# Patient Record
Sex: FEMALE | Race: WHITE | NOT HISPANIC OR LATINO | Employment: OTHER | URBAN - METROPOLITAN AREA
[De-identification: names, ages, dates, MRNs, and addresses within clinical notes are randomized per-mention and may not be internally consistent; named-entity substitution may affect disease eponyms.]

---

## 2021-08-30 ENCOUNTER — OFFICE VISIT (OUTPATIENT)
Dept: FAMILY MEDICINE | Facility: CLINIC | Age: 86
End: 2021-08-30
Payer: MEDICARE

## 2021-08-30 VITALS
WEIGHT: 203.81 LBS | HEIGHT: 63 IN | SYSTOLIC BLOOD PRESSURE: 141 MMHG | BODY MASS INDEX: 36.11 KG/M2 | TEMPERATURE: 97 F | DIASTOLIC BLOOD PRESSURE: 82 MMHG | OXYGEN SATURATION: 96 % | RESPIRATION RATE: 14 BRPM | HEART RATE: 89 BPM

## 2021-08-30 DIAGNOSIS — I25.10 CORONARY ARTERY DISEASE INVOLVING NATIVE CORONARY ARTERY, ANGINA PRESENCE UNSPECIFIED, UNSPECIFIED WHETHER NATIVE OR TRANSPLANTED HEART: ICD-10-CM

## 2021-08-30 DIAGNOSIS — G47.30 SLEEP APNEA, UNSPECIFIED TYPE: ICD-10-CM

## 2021-08-30 DIAGNOSIS — R53.83 FATIGUE, UNSPECIFIED TYPE: ICD-10-CM

## 2021-08-30 DIAGNOSIS — E55.9 VITAMIN D DEFICIENCY: ICD-10-CM

## 2021-08-30 DIAGNOSIS — E78.5 HYPERLIPIDEMIA, UNSPECIFIED HYPERLIPIDEMIA TYPE: ICD-10-CM

## 2021-08-30 DIAGNOSIS — K21.9 GERD WITHOUT ESOPHAGITIS: Primary | ICD-10-CM

## 2021-08-30 DIAGNOSIS — J45.991 COUGH VARIANT ASTHMA: ICD-10-CM

## 2021-08-30 DIAGNOSIS — R73.03 PREDIABETES: ICD-10-CM

## 2021-08-30 DIAGNOSIS — R79.9 ABNORMAL FINDING OF BLOOD CHEMISTRY, UNSPECIFIED: ICD-10-CM

## 2021-08-30 PROCEDURE — 99204 PR OFFICE/OUTPT VISIT, NEW, LEVL IV, 45-59 MIN: ICD-10-PCS | Mod: S$GLB,,, | Performed by: INTERNAL MEDICINE

## 2021-08-30 PROCEDURE — 99204 OFFICE O/P NEW MOD 45 MIN: CPT | Mod: S$GLB,,, | Performed by: INTERNAL MEDICINE

## 2021-08-30 RX ORDER — DICLOFENAC POTASSIUM 50 MG/1
50 TABLET, FILM COATED ORAL 3 TIMES DAILY
COMMUNITY
End: 2021-11-29 | Stop reason: SDUPTHER

## 2021-08-30 RX ORDER — ISOSORBIDE MONONITRATE 60 MG/1
60 TABLET, EXTENDED RELEASE ORAL DAILY
COMMUNITY
End: 2024-02-05

## 2021-08-30 RX ORDER — GLUCOSAMINE/CHONDRO SU A 500-400 MG
1 TABLET ORAL 3 TIMES DAILY
COMMUNITY

## 2021-08-30 RX ORDER — ASPIRIN 81 MG/1
81 TABLET ORAL DAILY
COMMUNITY
End: 2024-02-22 | Stop reason: SDUPTHER

## 2021-08-30 RX ORDER — HYDROGEN PEROXIDE 3 %
20 SOLUTION, NON-ORAL MISCELLANEOUS
COMMUNITY
End: 2022-03-22 | Stop reason: DRUGHIGH

## 2021-08-30 RX ORDER — NITROGLYCERIN 0.4 MG/1
0.4 TABLET SUBLINGUAL EVERY 5 MIN PRN
COMMUNITY
End: 2023-08-23 | Stop reason: SDUPTHER

## 2021-08-30 RX ORDER — METFORMIN HYDROCHLORIDE 500 MG/1
500 TABLET, EXTENDED RELEASE ORAL
COMMUNITY
End: 2021-11-29

## 2021-08-30 RX ORDER — TOLTERODINE 4 MG/1
4 CAPSULE, EXTENDED RELEASE ORAL
COMMUNITY
Start: 2021-03-30 | End: 2021-08-30

## 2021-08-30 RX ORDER — ROSUVASTATIN CALCIUM 5 MG/1
5 TABLET, COATED ORAL DAILY
COMMUNITY
Start: 2021-07-15 | End: 2022-03-03 | Stop reason: SDUPTHER

## 2021-08-30 RX ORDER — SALMETEROL XINAFOATE 50 UG/1
POWDER, METERED ORAL; RESPIRATORY (INHALATION)
COMMUNITY
Start: 2021-07-09 | End: 2023-08-23 | Stop reason: SDUPTHER

## 2021-08-30 RX ORDER — DOCUSATE SODIUM 100 MG/1
100 CAPSULE, LIQUID FILLED ORAL 2 TIMES DAILY
COMMUNITY
End: 2023-08-23 | Stop reason: SDUPTHER

## 2021-09-01 LAB
ABS NRBC COUNT: 0 X 10 3/UL (ref 0–0.01)
ABSOLUTE BASOPHIL: 0.09 X 10 3/UL (ref 0–0.22)
ABSOLUTE EOSINOPHIL: 0.15 X 10 3/UL (ref 0.04–0.54)
ABSOLUTE IMMATURE GRAN: 0.05 X 10 3/UL (ref 0–0.04)
ABSOLUTE LYMPHOCYTE: 1.63 X 10 3/UL (ref 0.86–4.75)
ABSOLUTE MONOCYTE: 0.49 X 10 3/UL (ref 0.22–1.08)
ALBUMIN SERPL-MCNC: 4.7 G/DL (ref 3.5–5.2)
ALP ISOS SERPL LEV INH-CCNC: 79 U/L (ref 35–105)
ALT (SGPT): 15 U/L (ref 0–33)
ANION GAP SERPL CALC-SCNC: 11 MMOL/L (ref 8–17)
AST SERPL-CCNC: 15 U/L (ref 0–32)
BASOPHILS NFR BLD: 1.5 % (ref 0.2–1.2)
BILIRUB CONJ+UNCONJ SERPL-MCNC: NORMAL MG/DL (ref 0.1–0.8)
BILIRUBIN DIRECT+TOT PNL SERPL-MCNC: <0.2 MG/DL (ref 0–0.3)
BILIRUBIN, TOTAL: 0.39 MG/DL (ref 0–1.2)
BUN/CREAT SERPL: 20.3 (ref 6–20)
CALCIUM SERPL-MCNC: 9.8 MG/DL (ref 8.6–10.2)
CARBON DIOXIDE, CO2: 27 MMOL/L (ref 22–29)
CHLORIDE: 104 MMOL/L (ref 98–107)
CHOLEST SERPL-MSCNC: 125 MG/DL (ref 100–200)
CREAT SERPL-MCNC: 0.91 MG/DL (ref 0.5–0.9)
EOSINOPHIL NFR BLD: 2.4 % (ref 0.7–7)
ESTIMATED AVERAGE GLUCOSE: 140 MG/DL
GFR ESTIMATION: 58.21
GLOBULIN: 2 G/DL (ref 1.5–4.5)
GLUCOSE: 127 MG/DL (ref 82–115)
HBA1C MFR BLD: 6.5 % (ref 4–6)
HCT VFR BLD AUTO: 37.3 % (ref 37–47)
HDLC SERPL-MCNC: 52 MG/DL
HGB BLD-MCNC: 11.8 G/DL (ref 12–16)
IMMATURE GRANULOCYTES: 0.8 % (ref 0–0.5)
IRON SERPL-MCNC: 62 UG/DL (ref 37–145)
LDL/HDL RATIO: 1.1 (ref 1–3)
LDLC SERPL CALC-MCNC: 54.8 MG/DL (ref 0–100)
LYMPHOCYTES NFR BLD: 26.3 % (ref 19.3–53.1)
MCH RBC QN AUTO: 28 PG (ref 27–32)
MCHC RBC AUTO-ENTMCNC: 31.6 G/DL (ref 32–36)
MCV RBC AUTO: 88.4 FL (ref 82–100)
MONOCYTES NFR BLD: 7.9 % (ref 4.7–12.5)
NEUTROPHILS # BLD AUTO: 3.78 X 10 3/UL (ref 2.15–7.56)
NEUTROPHILS NFR BLD: 61.1 %
NUCLEATED RED BLOOD CELLS: 0 /100 WBC (ref 0–0.2)
PLATELET # BLD AUTO: 219 X 10 3/UL (ref 135–400)
POTASSIUM: 4.1 MMOL/L (ref 3.5–5.1)
PROT SNV-MCNC: 6.7 G/DL (ref 6.4–8.3)
RBC # BLD AUTO: 4.22 X 10 6/UL (ref 4.2–5.4)
RDW-SD: 46.2 FL (ref 37–54)
SODIUM: 142 MMOL/L (ref 136–145)
TRIGL SERPL-MCNC: 91 MG/DL (ref 0–150)
TSH SERPL DL<=0.005 MIU/L-ACNC: 4.18 UIU/ML (ref 0.27–4.2)
UREA NITROGEN (BUN): 18.5 MG/DL (ref 8–23)
VITAMIN D (25OHD): 50.3 NG/ML
WBC # BLD: 6.19 X 10 3/UL (ref 4.3–10.8)

## 2021-09-02 ENCOUNTER — TELEPHONE (OUTPATIENT)
Dept: FAMILY MEDICINE | Facility: CLINIC | Age: 86
End: 2021-09-02

## 2021-09-07 ENCOUNTER — TELEPHONE (OUTPATIENT)
Dept: FAMILY MEDICINE | Facility: CLINIC | Age: 86
End: 2021-09-07

## 2021-11-29 ENCOUNTER — OFFICE VISIT (OUTPATIENT)
Dept: FAMILY MEDICINE | Facility: CLINIC | Age: 86
End: 2021-11-29
Payer: MEDICARE

## 2021-11-29 VITALS
DIASTOLIC BLOOD PRESSURE: 84 MMHG | TEMPERATURE: 97 F | HEART RATE: 83 BPM | HEIGHT: 63 IN | WEIGHT: 207.38 LBS | SYSTOLIC BLOOD PRESSURE: 155 MMHG | BODY MASS INDEX: 36.75 KG/M2 | OXYGEN SATURATION: 94 %

## 2021-11-29 DIAGNOSIS — E55.9 VITAMIN D DEFICIENCY: ICD-10-CM

## 2021-11-29 DIAGNOSIS — R73.03 PREDIABETES: ICD-10-CM

## 2021-11-29 DIAGNOSIS — K59.00 CONSTIPATION, UNSPECIFIED CONSTIPATION TYPE: ICD-10-CM

## 2021-11-29 DIAGNOSIS — I25.10 CORONARY ARTERY DISEASE INVOLVING NATIVE CORONARY ARTERY, UNSPECIFIED WHETHER ANGINA PRESENT, UNSPECIFIED WHETHER NATIVE OR TRANSPLANTED HEART: ICD-10-CM

## 2021-11-29 DIAGNOSIS — D50.9 IRON DEFICIENCY ANEMIA, UNSPECIFIED IRON DEFICIENCY ANEMIA TYPE: ICD-10-CM

## 2021-11-29 DIAGNOSIS — K21.9 GERD WITHOUT ESOPHAGITIS: ICD-10-CM

## 2021-11-29 DIAGNOSIS — E11.9 TYPE 2 DIABETES MELLITUS WITHOUT COMPLICATION, WITHOUT LONG-TERM CURRENT USE OF INSULIN: ICD-10-CM

## 2021-11-29 DIAGNOSIS — J45.991 COUGH VARIANT ASTHMA: Primary | ICD-10-CM

## 2021-11-29 DIAGNOSIS — N28.9 RENAL INSUFFICIENCY: ICD-10-CM

## 2021-11-29 DIAGNOSIS — E78.5 HYPERLIPIDEMIA, UNSPECIFIED HYPERLIPIDEMIA TYPE: ICD-10-CM

## 2021-11-29 DIAGNOSIS — M25.50 GENERALIZED JOINT PAIN: ICD-10-CM

## 2021-11-29 PROCEDURE — 99214 OFFICE O/P EST MOD 30 MIN: CPT | Mod: S$GLB,,, | Performed by: INTERNAL MEDICINE

## 2021-11-29 PROCEDURE — 99214 PR OFFICE/OUTPT VISIT, EST, LEVL IV, 30-39 MIN: ICD-10-PCS | Mod: S$GLB,,, | Performed by: INTERNAL MEDICINE

## 2021-11-29 RX ORDER — TOLTERODINE 4 MG/1
4 CAPSULE, EXTENDED RELEASE ORAL DAILY
COMMUNITY
End: 2022-04-20

## 2021-11-29 RX ORDER — POLYETHYLENE GLYCOL 3350 17 G/17G
17 POWDER, FOR SOLUTION ORAL
COMMUNITY
End: 2022-04-20

## 2021-11-29 RX ORDER — LOSARTAN POTASSIUM 100 MG/1
100 TABLET ORAL DAILY
COMMUNITY
Start: 2021-10-18 | End: 2022-02-07 | Stop reason: SDUPTHER

## 2021-11-29 RX ORDER — DICLOFENAC POTASSIUM 50 MG/1
50 TABLET, FILM COATED ORAL 3 TIMES DAILY PRN
Qty: 270 TABLET | Refills: 3 | Status: SHIPPED | OUTPATIENT
Start: 2021-11-29 | End: 2023-08-23 | Stop reason: SDUPTHER

## 2021-11-29 RX ORDER — METFORMIN HYDROCHLORIDE 500 MG/1
500 TABLET ORAL
Qty: 90 TABLET | Refills: 3 | Status: SHIPPED | OUTPATIENT
Start: 2021-11-29 | End: 2022-08-30

## 2021-11-29 RX ORDER — DILTIAZEM HYDROCHLORIDE 180 MG/1
CAPSULE, EXTENDED RELEASE ORAL
COMMUNITY
Start: 2021-01-08 | End: 2021-11-29

## 2022-01-31 ENCOUNTER — TELEPHONE (OUTPATIENT)
Dept: FAMILY MEDICINE | Facility: CLINIC | Age: 87
End: 2022-01-31
Payer: MEDICARE

## 2022-01-31 NOTE — TELEPHONE ENCOUNTER
----- Message from Carmen Samuel sent at 1/31/2022 10:07 AM CST -----  Type:  Needs Medical Advice    Who Called: No patient name on file.    Symptoms (please be specific): -   How long has patient had these symptoms:  -  Pharmacy name and phone #:  -  Would the patient rather a call back or a response via [facility]?    Best Call Back Number: [phone]    Additional Information: Needs to speak w/ Nurse about her Diabetes medical supplies, please call

## 2022-02-01 DIAGNOSIS — R73.03 PREDIABETES: Primary | ICD-10-CM

## 2022-02-01 NOTE — TELEPHONE ENCOUNTER
----- Message from Carmen Samuel sent at 2/1/2022  8:42 AM CST -----  Type:  Needs Medical Advice    Who Called: Ana VIDES Jose    Symptoms (please be specific): -   How long has patient had these symptoms:  -  Pharmacy name and phone #:    [id number] - [place], [place] - [address] [facility] [address]  [address]  [address]  [place] [address]  Phone: [phone] Fax: [phone]      Would the patient rather a call back or a response via [facility]?    Best Call Back Number: [phone]    Additional Information:  pt needs a new script for the contour next glucose strips she spoke w/ medicare and was told to have the Dr send a script for them

## 2022-02-02 DIAGNOSIS — R73.03 PREDIABETES: ICD-10-CM

## 2022-02-04 DIAGNOSIS — R73.03 PREDIABETES: ICD-10-CM

## 2022-02-04 NOTE — TELEPHONE ENCOUNTER
Patient states the wrong test strips were called out, she uses contour next not contour. I gave verbal order to pharmacy and also updated her med list.

## 2022-02-07 RX ORDER — LOSARTAN POTASSIUM 100 MG/1
100 TABLET ORAL DAILY
Qty: 90 TABLET | Refills: 3 | Status: SHIPPED | OUTPATIENT
Start: 2022-02-07

## 2022-02-07 NOTE — TELEPHONE ENCOUNTER
----- Message from Socorro Smith sent at 2/7/2022  9:01 AM CST -----  Contact: PT  .Type:  RX Refill Request    Who Called:  Ana   Refill or New Rx: refill   RX Name and Strength:losartan (COZAAR) 100 MG tablet   How is the patient currently taking it? (ex. 1XDay):   Is this a 30 day or 90 day RX: 90   Preferred Pharmacy with phone number: .  [id number] - [place], [place] - [address] [facility] OF  & [place]  [address]  [place] [address]  Phone: [phone] Fax: [phone]      Local or Mail Order: local   Ordering Provider: Chelsea    Would the patient rather a call back or a response via [facility]?  Callback   Best Call Back Number: .[phone]    Additional Information:  3rd request to call back about diabetic supplies

## 2022-03-03 DIAGNOSIS — E11.9 TYPE 2 DIABETES MELLITUS WITHOUT COMPLICATION, WITHOUT LONG-TERM CURRENT USE OF INSULIN: Primary | ICD-10-CM

## 2022-03-03 RX ORDER — ROSUVASTATIN CALCIUM 5 MG/1
5 TABLET, COATED ORAL DAILY
Qty: 90 TABLET | Refills: 3 | Status: SHIPPED | OUTPATIENT
Start: 2022-03-03 | End: 2022-12-13

## 2022-03-03 NOTE — TELEPHONE ENCOUNTER
----- Message from Josie Crain sent at 3/3/2022  9:40 AM CST -----  Patient calling for refill on medications, rosuvastatin (CRESTOR) 5 MG tablet      [id number] - [place]  [address] [facility] [address] [facility] AT [facility] [address] [place]  [address] [facility] [address] [facility]  [place] [address]  Phone: [phone] Fax: [phone]     Call back number [phone]

## 2022-03-03 NOTE — TELEPHONE ENCOUNTER
----- Message from Fito Groves sent at 3/3/2022  4:05 PM CST -----  Contact: SELF  Patient reeturned nurse call. Patient can be reached at [phone]

## 2022-03-03 NOTE — TELEPHONE ENCOUNTER
Pt still has not been given correct test strips. She will need new order for contour next test strips to be sent to pharmacy.

## 2022-03-03 NOTE — TELEPHONE ENCOUNTER
----- Message from Josie Crain sent at 3/3/2022  9:42 AM CST -----  Patient need to speak to nurse regarding her order for test strips for the contour plus. Call back number [phone]. Tks

## 2022-03-21 ENCOUNTER — TELEPHONE (OUTPATIENT)
Dept: FAMILY MEDICINE | Facility: CLINIC | Age: 87
End: 2022-03-21
Payer: MEDICARE

## 2022-03-21 NOTE — TELEPHONE ENCOUNTER
Pt notified that I have scheduled her for tomorrow at 3:30pm. She was concerned because it's supposed to storm. I told her if there is any reason to cancel we can reschedule her for another date/time.

## 2022-03-21 NOTE — TELEPHONE ENCOUNTER
----- Message from Monique Frazier sent at 3/21/2022 10:08 AM CDT -----  Type:  Same Day Appointment Request    Caller is requesting a same day appointment.  Caller declined first available appointment listed below.    Name of Caller:Ana Garcia  When is the first available appointment?n/a   Symptoms:problems, feeling weak  Best Call Back Number:[phone]   Additional Information: n/a

## 2022-03-22 ENCOUNTER — OFFICE VISIT (OUTPATIENT)
Dept: FAMILY MEDICINE | Facility: CLINIC | Age: 87
End: 2022-03-22
Payer: MEDICARE

## 2022-03-22 VITALS
HEIGHT: 63 IN | HEART RATE: 88 BPM | TEMPERATURE: 98 F | DIASTOLIC BLOOD PRESSURE: 83 MMHG | SYSTOLIC BLOOD PRESSURE: 163 MMHG | OXYGEN SATURATION: 98 % | BODY MASS INDEX: 36.72 KG/M2 | WEIGHT: 207.25 LBS

## 2022-03-22 DIAGNOSIS — I25.10 CORONARY ARTERY DISEASE INVOLVING NATIVE CORONARY ARTERY, UNSPECIFIED WHETHER ANGINA PRESENT, UNSPECIFIED WHETHER NATIVE OR TRANSPLANTED HEART: ICD-10-CM

## 2022-03-22 DIAGNOSIS — K21.9 GERD WITHOUT ESOPHAGITIS: ICD-10-CM

## 2022-03-22 DIAGNOSIS — E55.9 VITAMIN D DEFICIENCY: ICD-10-CM

## 2022-03-22 DIAGNOSIS — J45.991 COUGH VARIANT ASTHMA: ICD-10-CM

## 2022-03-22 DIAGNOSIS — M81.0 OSTEOPOROSIS, UNSPECIFIED OSTEOPOROSIS TYPE, UNSPECIFIED PATHOLOGICAL FRACTURE PRESENCE: ICD-10-CM

## 2022-03-22 DIAGNOSIS — M25.50 GENERALIZED JOINT PAIN: ICD-10-CM

## 2022-03-22 DIAGNOSIS — N28.9 RENAL INSUFFICIENCY: ICD-10-CM

## 2022-03-22 DIAGNOSIS — E11.69 TYPE 2 DIABETES MELLITUS WITH OTHER SPECIFIED COMPLICATION, WITHOUT LONG-TERM CURRENT USE OF INSULIN: ICD-10-CM

## 2022-03-22 DIAGNOSIS — G47.30 SLEEP APNEA, UNSPECIFIED TYPE: ICD-10-CM

## 2022-03-22 DIAGNOSIS — R06.00 DYSPNEA, UNSPECIFIED TYPE: Primary | ICD-10-CM

## 2022-03-22 DIAGNOSIS — D50.9 IRON DEFICIENCY ANEMIA, UNSPECIFIED IRON DEFICIENCY ANEMIA TYPE: ICD-10-CM

## 2022-03-22 DIAGNOSIS — E78.5 HYPERLIPIDEMIA, UNSPECIFIED HYPERLIPIDEMIA TYPE: ICD-10-CM

## 2022-03-22 PROBLEM — E11.9 TYPE 2 DIABETES MELLITUS, WITHOUT LONG-TERM CURRENT USE OF INSULIN: Status: ACTIVE | Noted: 2022-03-22

## 2022-03-22 PROCEDURE — 99214 PR OFFICE/OUTPT VISIT, EST, LEVL IV, 30-39 MIN: ICD-10-PCS | Mod: S$GLB,,, | Performed by: INTERNAL MEDICINE

## 2022-03-22 PROCEDURE — 99214 OFFICE O/P EST MOD 30 MIN: CPT | Mod: S$GLB,,, | Performed by: INTERNAL MEDICINE

## 2022-03-22 RX ORDER — ESOMEPRAZOLE MAGNESIUM 40 MG/1
40 CAPSULE, DELAYED RELEASE ORAL
Qty: 90 CAPSULE | Refills: 3 | Status: SHIPPED | OUTPATIENT
Start: 2022-03-22 | End: 2023-03-22

## 2022-03-22 RX ORDER — PANTOPRAZOLE SODIUM 40 MG/1
40 TABLET, DELAYED RELEASE ORAL DAILY
Qty: 30 TABLET | Refills: 11 | Status: SHIPPED | OUTPATIENT
Start: 2022-03-22 | End: 2022-03-22 | Stop reason: CLARIF

## 2022-03-22 RX ORDER — DENOSUMAB 60 MG/ML
60 INJECTION SUBCUTANEOUS
COMMUNITY

## 2022-03-22 RX ORDER — TRAMADOL HYDROCHLORIDE 50 MG/1
50 TABLET ORAL
COMMUNITY
End: 2022-04-20

## 2022-03-22 RX ORDER — DILTIAZEM HYDROCHLORIDE 180 MG/1
180 CAPSULE, EXTENDED RELEASE ORAL
COMMUNITY
Start: 2022-01-24 | End: 2023-08-23 | Stop reason: SDUPTHER

## 2022-03-22 RX ORDER — NABUMETONE 500 MG/1
200 TABLET, FILM COATED ORAL 2 TIMES DAILY
COMMUNITY
End: 2022-03-22

## 2022-03-22 RX ORDER — KETOROLAC TROMETHAMINE 5 MG/ML
0.5 SOLUTION OPHTHALMIC DAILY
COMMUNITY
Start: 2021-11-11

## 2022-03-22 NOTE — PROGRESS NOTES
Subjective:       Patient ID: Ana Garcia is a 89 y.o. female.    Chief Complaint: Extremity Weakness, Diarrhea, Constipation, Abdominal Pain (After she eats feels it gets hung up), Neck Pain (Right side seeing Dr Mae tomorrow), Shoulder Pain (Right shoulder, seeing Dr Mae tomorrow), burping (All the time), Gastroesophageal Reflux, not eating well (Because of bowel issues and feels food is getting stuck in throat.), Insomnia, and Hiatal Hernia      HPI: Ana comes in today for follow-up.  She says overall she is doing well in for 89 she is very sharp mentally.  She has been having some midepigastric discomfort with some symptoms of reflux.  She denies dysphagia.  She does not describe any PND or orthopnea, but she says she does have some shortness of breath at times.  She denies chest pains.  She did have 1 episode of short chest pain that she ended up going to [facility] ER for few months back.  She is followed by Dr. Munoz for her cardiac problems.  She has also had some diarrhea.  Randall Horan last scoped her about 10 years ago.  I have talked to her about her age and her medical conditions may be contributing to some of this shortness of breath fatigue.  We are going to repeat some blood work today.  I am going to increase her Nexium from 20-40 mg and with the set her up with an appointment to see GI.  I am really more interested in them evaluating her upper GI tract.       Past Medical History:   Past Medical History:   Diagnosis Date    Chronic airway obstruction     Diabetes mellitus, type 2     Fatigue     GERD (gastroesophageal reflux disease)     Heart disease     Hip pain     Hypertension     Incontinence     IPMN (intraductal papillary mucinous neoplasm)     Macular degeneration     Malaise     Nerve root inflammation     Osteoporosis     Sleep apnea     Venous insufficiency     Yeast infection        Past Surgical Historical:   Past Surgical History:   Procedure Laterality  "Date    BREAST LUMPECTOMY Right     CATARACT EXTRACTION  2001 and 2002    cystocopy      FEMUR SURGERY      TANDEM AND OVOID INSERTION      TOTAL HIP ARTHROPLASTY          Vitals: BP (!) 163/83 (BP Location: Right arm, Patient Position: Sitting, BP Method: Medium (Automatic))   Pulse 88   Temp 98.2 °F (36.8 °C) (Temporal)   Ht 5' 3" (1.6 m)   Wt 94 kg (207 lb 4 oz)   SpO2 98%   BMI 36.71 kg/m²      Medications:   Medication List with Changes/Refills   New Medications    ESOMEPRAZOLE (NEXIUM) 40 MG CAPSULE    Take 1 capsule (40 mg total) by mouth before breakfast.   Current Medications    ASPIRIN (ECOTRIN) 81 MG EC TABLET    Take 81 mg by mouth once daily.    BLOOD SUGAR DIAGNOSTIC (CONTOUR NEXT TEST STRIPS) STRP    1 strip by Misc.(Non-Drug; Combo Route) route once daily.    BLOOD-GLUCOSE METER (CONTOUR NEXT METER MISC)    by Misc.(Non-Drug; Combo Route) route.    DENOSUMAB (PROLIA) 60 MG/ML SYRG    Inject 60 mg into the skin every 6 (six) months.    DICLOFENAC (CATAFLAM) 50 MG TABLET    Take 1 tablet (50 mg total) by mouth 3 (three) times daily as needed.    DILTIAZEM (TIAZAC) 180 MG CS24    Take 180 mg by mouth.    DOCUSATE SODIUM (COLACE) 100 MG CAPSULE    Take 100 mg by mouth 2 (two) times daily.    YKPUYBNI-[id number]-[id number]-C-SADIQ-BOSW 750 MG-644 MG- 30 MG-1 MG TAB    Take 750 mg by mouth once daily at 6am.    GLUCOSAMINE-CHONDROITIN 500-400 MG TABLET    Take 1 tablet by mouth 3 (three) times daily.    ISOSORBIDE MONONITRATE (IMDUR) 60 MG 24 HR TABLET    Take 60 mg by mouth once daily.    KETOROLAC 0.5% (ACULAR) 0.5 % DROP    Place 0.5 drops into both eyes once daily at 6am.    LANCETS (MICROLET LANCET MISC)    Microlet Lancet   USE AS DIRECTED    LOSARTAN (COZAAR) 100 MG TABLET    Take 1 tablet (100 mg total) by mouth once daily.    METFORMIN (GLUCOPHAGE) 500 MG TABLET    Take 1 tablet (500 mg total) by mouth daily with breakfast.    NITROGLYCERIN (NITROSTAT) 0.4 MG SL TABLET    Place 0.4 mg under the " tongue every 5 (five) minutes as needed for Chest pain.    POLYETHYLENE GLYCOL (GLYCOLAX) 17 GRAM/DOSE POWDER    Take 17 g by mouth as needed.    RANIBIZUMAB (LUCENTIS) 0.5 MG/0.05 ML SOLN INJECTION    0.5 mg.    ROSUVASTATIN (CRESTOR) 5 MG TABLET    Take 1 tablet (5 mg total) by mouth once daily.    SEREVENT DISKUS 50 MCG/DOSE DISKUS INHALER    TAKE 1 PUFF BY MOUTH TWICE A DAY    TOLTERODINE (DETROL [place]) 4 MG 24 HR CAPSULE    Take 4 mg by mouth once daily.    TRAMADOL (ULTRAM) 50 MG TABLET    Take 50 mg by mouth every 6 to 8 hours as needed.   Discontinued Medications    ESOMEPRAZOLE (NEXIUM) 20 MG CAPSULE    Take 20 mg by mouth before breakfast.    NABUMETONE (RELAFEN) 500 MG TABLET    Take 200 mg by mouth 2 (two) times daily.        Past Social History:   Social History     Socioeconomic History    Marital status:    Tobacco Use    Smoking status: Never Smoker    Smokeless tobacco: Never Used       Allergies:   Review of patient's allergies indicates:   Allergen Reactions    Adhesive tape-silicones     Codeine     Iodine and iodide containing products     Penicillins     Sulfa (sulfonamide antibiotics)         Family History: History reviewed. No pertinent family history.     Review of Systems:  Review of Systems   Respiratory: Negative for shortness of breath and wheezing.    Cardiovascular: Negative for chest pain and palpitations.   Gastrointestinal: Positive for reflux. Negative for abdominal pain, change in bowel habit and change in bowel habit.   Musculoskeletal: Negative for gait problem.   Neurological: Negative for dizziness and syncope.   Psychiatric/Behavioral: Negative for suicidal ideas.        Physical Exam:  Physical Exam  Constitutional:       Appearance: Normal appearance.   Cardiovascular:      Rate and Rhythm: Normal rate and regular rhythm.   Pulmonary:      Effort: Pulmonary effort is normal.      Breath sounds: Normal breath sounds.   Abdominal:      General: Abdomen is flat.       Palpations: Abdomen is soft.   Skin:     General: Skin is warm and dry.   Neurological:      General: No focal deficit present.      Mental Status: She is alert and oriented to person, place, and time.   Psychiatric:         Mood and Affect: Mood normal.          Assessment/Plan:       Problem List Items Addressed This Visit        Pulmonary    Cough variant asthma       Cardiac/Vascular    Hyperlipidemia    Coronary artery disease involving native coronary artery    Current Assessment & Plan     Dr. Munoz              Renal/    Renal insufficiency       Oncology    Iron deficiency anemia       Endocrine    Vitamin D deficiency    Type 2 diabetes mellitus, without long-term current use of insulin       GI    GERD without esophagitis    Relevant Medications    esomeprazole (NEXIUM) 40 MG capsule    Other Relevant Orders    Ambulatory referral/consult to Gastroenterology       Orthopedic    Osteoporosis       Other    Generalized joint pain    Sleep apnea      Other Visit Diagnoses     Dyspnea, unspecified type    -  Primary    Relevant Orders    BNP               Follow up in about 6 months (around 9/22/2022).     Catrachito Tran

## 2022-03-23 ENCOUNTER — TELEPHONE (OUTPATIENT)
Dept: FAMILY MEDICINE | Facility: CLINIC | Age: 87
End: 2022-03-23
Payer: MEDICARE

## 2022-04-20 ENCOUNTER — OFFICE VISIT (OUTPATIENT)
Dept: FAMILY MEDICINE | Facility: CLINIC | Age: 87
End: 2022-04-20
Payer: MEDICARE

## 2022-04-20 VITALS
OXYGEN SATURATION: 97 % | RESPIRATION RATE: 16 BRPM | HEART RATE: 99 BPM | DIASTOLIC BLOOD PRESSURE: 71 MMHG | BODY MASS INDEX: 37.74 KG/M2 | TEMPERATURE: 97 F | SYSTOLIC BLOOD PRESSURE: 152 MMHG | WEIGHT: 213 LBS | HEIGHT: 63 IN

## 2022-04-20 DIAGNOSIS — N28.9 RENAL INSUFFICIENCY: ICD-10-CM

## 2022-04-20 DIAGNOSIS — D50.9 IRON DEFICIENCY ANEMIA, UNSPECIFIED IRON DEFICIENCY ANEMIA TYPE: ICD-10-CM

## 2022-04-20 DIAGNOSIS — E78.5 HYPERLIPIDEMIA, UNSPECIFIED HYPERLIPIDEMIA TYPE: ICD-10-CM

## 2022-04-20 DIAGNOSIS — R60.0 PERIPHERAL EDEMA: Primary | ICD-10-CM

## 2022-04-20 DIAGNOSIS — E55.9 VITAMIN D DEFICIENCY: ICD-10-CM

## 2022-04-20 DIAGNOSIS — K21.9 GERD WITHOUT ESOPHAGITIS: ICD-10-CM

## 2022-04-20 DIAGNOSIS — K59.00 CONSTIPATION, UNSPECIFIED CONSTIPATION TYPE: ICD-10-CM

## 2022-04-20 DIAGNOSIS — E11.9 TYPE 2 DIABETES MELLITUS WITHOUT COMPLICATION, WITHOUT LONG-TERM CURRENT USE OF INSULIN: ICD-10-CM

## 2022-04-20 DIAGNOSIS — R53.83 FATIGUE, UNSPECIFIED TYPE: ICD-10-CM

## 2022-04-20 PROCEDURE — 99213 OFFICE O/P EST LOW 20 MIN: CPT | Mod: S$GLB,,, | Performed by: OBSTETRICS & GYNECOLOGY

## 2022-04-20 PROCEDURE — 99213 PR OFFICE/OUTPT VISIT, EST, LEVL III, 20-29 MIN: ICD-10-PCS | Mod: S$GLB,,, | Performed by: OBSTETRICS & GYNECOLOGY

## 2022-04-20 RX ORDER — GABAPENTIN 300 MG/1
CAPSULE ORAL
COMMUNITY
Start: 2022-04-11 | End: 2023-08-23 | Stop reason: SDUPTHER

## 2022-04-20 RX ORDER — TRIAMTERENE AND HYDROCHLOROTHIAZIDE 37.5; 25 MG/1; MG/1
1 CAPSULE ORAL EVERY MORNING
Qty: 30 CAPSULE | Refills: 0 | Status: SHIPPED | OUTPATIENT
Start: 2022-04-20 | End: 2022-04-20

## 2022-04-20 NOTE — PROGRESS NOTES
Subjective:   Patient ID: Ana Garcia is a 89 y.o. female who presents for evaluation today.    Chief Complaint:  Edema (Patient states that she is having swelling in her feet since yesterday. She also states that she is having a little bit of numbness in her right arm.)      History of Present Illness  HPI    Patient presents today for further evaluation of leg swelling. She states she noticed the swelling yesterday. The swelling is present in both legs. She denies chest pain, SOB, palpitations, dyspnea, etc.     FAMILY HISTORY  History reviewed. No pertinent family history.  SOCIAL HISTORY  Social History     Tobacco Use   Smoking Status Never Smoker   Smokeless Tobacco Never Used   ,   Social History     Substance and Sexual Activity   Alcohol Use None     MEDICATIONS  Outpatient Medications Marked as Taking for the 4/20/22 encounter (Office Visit) with Cha Jones NP   Medication Sig Dispense Refill    aspirin (ECOTRIN) 81 MG EC tablet Take 81 mg by mouth once daily.      blood sugar diagnostic (CONTOUR NEXT TEST STRIPS) Strp 1 strip by Misc.(Non-Drug; Combo Route) route once daily. 100 strip 3    blood-glucose meter (CONTOUR NEXT METER MISC) by Misc.(Non-Drug; Combo Route) route.      denosumab (PROLIA) 60 mg/mL Syrg Inject 60 mg into the skin every 6 (six) months.      diclofenac (CATAFLAM) 50 MG tablet Take 1 tablet (50 mg total) by mouth 3 (three) times daily as needed. (Patient taking differently: Take 75 mg by mouth 2 (two) times a day.) 270 tablet 3    diltiaZEM (TIAZAC) 180 MG Cs24 Take 180 mg by mouth.      docusate sodium (COLACE) 100 MG capsule Take 100 mg by mouth 2 (two) times daily.      esomeprazole (NEXIUM) 40 MG capsule Take 1 capsule (40 mg total) by mouth before breakfast. 90 capsule 3    gabapentin (NEURONTIN) 300 MG capsule TAKE 1 CAPSULE BY MOUTH EVERY DAY AT BEDTIME      nayodnki-zedk-rqy6-C-kenya-bosw 750 mg-644 mg- 30 mg-1 mg Tab Take 750 mg by mouth once daily at 6am.       glucosamine-chondroitin 500-400 mg tablet Take 1 tablet by mouth 3 (three) times daily.      isosorbide mononitrate (IMDUR) 60 MG 24 hr tablet Take 60 mg by mouth once daily.      ketorolac 0.5% (ACULAR) 0.5 % Drop Place 0.5 drops into both eyes once daily at 6am.      lancets (MICROLET LANCET MISC) Microlet Lancet   USE AS DIRECTED      losartan (COZAAR) 100 MG tablet Take 1 tablet (100 mg total) by mouth once daily. 90 tablet 3    metFORMIN (GLUCOPHAGE) 500 MG tablet Take 1 tablet (500 mg total) by mouth daily with breakfast. 90 tablet 3    ranibizumab (LUCENTIS) 0.5 mg/0.05 mL Soln injection 0.5 mg.      rosuvastatin (CRESTOR) 5 MG tablet Take 1 tablet (5 mg total) by mouth once daily. 90 tablet 3    SEREVENT DISKUS 50 mcg/dose diskus inhaler TAKE 1 PUFF BY MOUTH TWICE A DAY       Review of Systems   Review of Systems   Constitutional: Negative for activity change, appetite change, fever and unexpected weight change.   HENT: Negative for dental problem, hearing loss, sneezing, sore throat, trouble swallowing and voice change.    Eyes: Negative for visual disturbance.   Respiratory: Negative for choking, chest tightness, shortness of breath and stridor.    Cardiovascular: Positive for leg swelling. Negative for chest pain and palpitations.   Gastrointestinal: Negative for abdominal pain, anal bleeding, blood in stool, change in bowel habit, nausea, vomiting, fecal incontinence and change in bowel habit.   Endocrine: Negative for polydipsia, polyphagia and polyuria.   Genitourinary: Negative for decreased urine volume, difficulty urinating, dysuria, frequency, hematuria and urgency.   Musculoskeletal: Negative for gait problem, joint swelling, neck pain, neck stiffness and joint deformity.   Integumentary:  Negative for color change, pallor, rash, wound and mole/lesion.   Allergic/Immunologic: Negative for immunocompromised state.   Neurological: Negative for vertigo, seizures, syncope, weakness,  "light-headedness, disturbances in coordination and coordination difficulties.   Hematological: Negative for adenopathy. Does not bruise/bleed easily.   Psychiatric/Behavioral: Negative for agitation, behavioral problems, confusion, hallucinations, sleep disturbance and suicidal ideas.         Objective:    VITALS  BP Readings from Last 1 Encounters:   04/20/22 (!) 152/71   Weight: 96.6 kg (213 lb)   Height: 5' 3" (160 cm)   BMI Readings from Last 1 Encounters:   04/20/22 37.73 kg/m²       Physical Exam  Vitals reviewed.   Constitutional:       General: She is not in acute distress.     Appearance: Normal appearance. She is not ill-appearing, toxic-appearing or diaphoretic.   HENT:      Head: Normocephalic and atraumatic.      Right Ear: External ear normal.      Left Ear: External ear normal.      Nose: Nose normal. No congestion or rhinorrhea.   Eyes:      General:         Right eye: No discharge.         Left eye: No discharge.   Cardiovascular:      Rate and Rhythm: Normal rate and regular rhythm.      Heart sounds: Normal heart sounds. No murmur heard.    No friction rub. No gallop.   Pulmonary:      Effort: Pulmonary effort is normal. No respiratory distress.      Breath sounds: Normal breath sounds. No stridor. No wheezing, rhonchi or rales.   Musculoskeletal:         General: Normal range of motion.      Cervical back: Normal range of motion and neck supple.      Right lower leg: Edema present.      Left lower leg: Edema present.   Skin:     General: Skin is warm and dry.      Coloration: Skin is not jaundiced or pale.      Findings: No rash.   Neurological:      General: No focal deficit present.      Mental Status: She is alert and oriented to person, place, and time.      Gait: Gait normal.   Psychiatric:         Mood and Affect: Mood normal.         Behavior: Behavior normal.         Thought Content: Thought content normal.         Judgment: Judgment normal.         Assessment:      1. Peripheral edema  "   2. Fatigue, unspecified type    3. GERD without esophagitis    4. Hyperlipidemia, unspecified hyperlipidemia type    5. Vitamin D deficiency    6. Type 2 diabetes mellitus without complication, without long-term current use of insulin    7. Constipation, unspecified constipation type    8. Iron deficiency anemia, unspecified iron deficiency anemia type    9. Renal insufficiency       Plan:   Peripheral edema  -     triamterene-hydrochlorothiazide 37.5-25 mg (DYAZIDE) 37.5-25 mg per capsule; Take 1 capsule by mouth every morning.  Dispense: 30 capsule; Refill: 0  -     BNP; Future; Expected date: 04/20/2022  May be associated with NSAID use. Will check labs. Plan diuretic for PRN use. Medication regimen, side effects & necessary monitoring discussed with patient. RTC if symptoms worsen, do not improve, or other symptoms develop.      Patient instructed to contact the clinic should any questions or concerns arise prior to next office visit. Risks, benefits, and alternatives discussed with patient. Patient verbalized understanding of discussed plan of care. Asked patient if any further questions, answered no. Follow up as discussed or sooner PRN.

## 2022-05-12 DIAGNOSIS — D64.9 ANEMIA, UNSPECIFIED TYPE: Primary | ICD-10-CM

## 2022-06-07 ENCOUNTER — TELEPHONE (OUTPATIENT)
Dept: FAMILY MEDICINE | Facility: CLINIC | Age: 87
End: 2022-06-07
Payer: MEDICARE

## 2022-06-07 NOTE — TELEPHONE ENCOUNTER
----- Message from Teressa Monk LPN sent at 6/6/2022  4:21 PM CDT -----  Contact: Pharmacy    ----- Message -----  From: Socorro Smith  Sent: 6/6/2022   9:30 AM CDT  To: Chelsea Ruano    .Type:  Pharmacy Calling to Clarify an RX    Name of Caller: Rajesh   Pharmacy Name: .  [id number] - [place], [place] - [address] AT [facility] [address]  [facility]  [address]  [place] [address]  Phone: [phone] Fax: [phone]       Prescription Name: diabetic testing supllies    What do they need to clarify?: form recvd needs Dr signature     Best Call Back Number: [phone]  Additional Information:

## 2022-06-08 ENCOUNTER — TELEPHONE (OUTPATIENT)
Dept: FAMILY MEDICINE | Facility: CLINIC | Age: 87
End: 2022-06-08
Payer: MEDICARE

## 2022-06-08 NOTE — TELEPHONE ENCOUNTER
----- Message from Thelma Collins sent at 6/8/2022  2:11 PM CDT -----  .Type:  Pharmacy Calling to Clarify an RX    Name of Caller: christo  Pharmacy Name: [facility]  Prescription Name:test strips  What do they need to clarify? diagnosis code  Best Call Back Number: [phone]  Additional Information: PT WAITING

## 2023-02-02 LAB
LEFT EYE DM RETINOPATHY: NEGATIVE
RIGHT EYE DM RETINOPATHY: NEGATIVE

## 2023-05-25 LAB
LEFT EYE DM RETINOPATHY: NEGATIVE
RIGHT EYE DM RETINOPATHY: NEGATIVE

## 2023-06-07 LAB
CHOLESTEROL, TOTAL: 124
HDL/CHOLESTEROL RATIO: 2.4 %
HDLC SERPL-MCNC: 51 MG/DL
LDLC SERPL CALC-MCNC: 52 MG/DL
NON HDL CHOL. (LDL+VLDL): 73
TRIGL SERPL-MCNC: 131 MG/DL

## 2023-08-03 LAB
LEFT EYE DM RETINOPATHY: NEGATIVE
RIGHT EYE DM RETINOPATHY: NEGATIVE

## 2023-08-18 ENCOUNTER — TELEPHONE (OUTPATIENT)
Dept: FAMILY MEDICINE | Facility: CLINIC | Age: 88
End: 2023-08-18
Payer: MEDICARE

## 2023-08-18 NOTE — TELEPHONE ENCOUNTER
----- Message from Lara Valenzuela sent at 8/18/2023 10:52 AM CDT -----  Contact: self  Pt is calling in regards to an appt . Pt is needing refills on medication and will be out in 2 weeks . Please call pt at [phone] .

## 2023-08-21 ENCOUNTER — TELEPHONE (OUTPATIENT)
Dept: FAMILY MEDICINE | Facility: CLINIC | Age: 88
End: 2023-08-21
Payer: MEDICARE

## 2023-08-21 NOTE — TELEPHONE ENCOUNTER
----- Message from Carmen Samuel sent at 8/21/2023 11:05 AM CDT -----  Type:  Patient Returning Call    Who Called: Ana Garcia    Who Left Message for Patient: Maya   Does the patient know what this is regarding?:-  Would the patient rather a call back or a response via [facility]?    Best Call Back Number:[phone]    Additional Information: returning Maya's call

## 2023-08-23 ENCOUNTER — OFFICE VISIT (OUTPATIENT)
Dept: FAMILY MEDICINE | Facility: CLINIC | Age: 88
End: 2023-08-23
Payer: MEDICARE

## 2023-08-23 VITALS
HEIGHT: 63 IN | HEART RATE: 87 BPM | WEIGHT: 199.19 LBS | TEMPERATURE: 97 F | SYSTOLIC BLOOD PRESSURE: 120 MMHG | BODY MASS INDEX: 35.29 KG/M2 | DIASTOLIC BLOOD PRESSURE: 78 MMHG | OXYGEN SATURATION: 97 % | RESPIRATION RATE: 15 BRPM

## 2023-08-23 DIAGNOSIS — G47.30 SLEEP APNEA, UNSPECIFIED TYPE: ICD-10-CM

## 2023-08-23 DIAGNOSIS — E11.69 TYPE 2 DIABETES MELLITUS WITH OTHER SPECIFIED COMPLICATION, WITHOUT LONG-TERM CURRENT USE OF INSULIN: ICD-10-CM

## 2023-08-23 DIAGNOSIS — F32.A DEPRESSIVE DISORDER: ICD-10-CM

## 2023-08-23 DIAGNOSIS — Z76.89 ENCOUNTER TO ESTABLISH CARE: Primary | ICD-10-CM

## 2023-08-23 DIAGNOSIS — E78.5 HYPERLIPIDEMIA, UNSPECIFIED HYPERLIPIDEMIA TYPE: ICD-10-CM

## 2023-08-23 DIAGNOSIS — G89.29 OTHER CHRONIC PAIN: ICD-10-CM

## 2023-08-23 DIAGNOSIS — E66.01 SEVERE OBESITY (BMI 35.0-39.9) WITH COMORBIDITY: ICD-10-CM

## 2023-08-23 DIAGNOSIS — J44.9 CHRONIC OBSTRUCTIVE PULMONARY DISEASE, UNSPECIFIED COPD TYPE: ICD-10-CM

## 2023-08-23 DIAGNOSIS — H35.3231 EXUDATIVE AGE-RELATED MACULAR DEGENERATION, BILATERAL, WITH ACTIVE CHOROIDAL NEOVASCULARIZATION: ICD-10-CM

## 2023-08-23 DIAGNOSIS — R53.83 FATIGUE, UNSPECIFIED TYPE: ICD-10-CM

## 2023-08-23 DIAGNOSIS — E55.9 VITAMIN D DEFICIENCY: ICD-10-CM

## 2023-08-23 DIAGNOSIS — R21 RASH: ICD-10-CM

## 2023-08-23 DIAGNOSIS — M81.0 OSTEOPOROSIS, UNSPECIFIED OSTEOPOROSIS TYPE, UNSPECIFIED PATHOLOGICAL FRACTURE PRESENCE: ICD-10-CM

## 2023-08-23 LAB
25(OH)D3 SERPL-MCNC: 33 NG/ML
ALBUMIN SERPL BCP-MCNC: 3.8 G/DL (ref 3.4–5)
ALP SERPL-CCNC: 66 U/L (ref 45–117)
ALT SERPL W P-5'-P-CCNC: 21 U/L (ref 13–56)
ANION GAP SERPL CALC-SCNC: 6 MMOL/L (ref 3–11)
AST SERPL-CCNC: 16 U/L (ref 15–37)
BILIRUB SERPL-MCNC: 0.3 MG/DL (ref 0.2–1)
BUN SERPL-MCNC: 16 MG/DL (ref 7–18)
BUN/CREAT SERPL: 16.84 RATIO
CALCIUM SERPL-MCNC: 10.1 MG/DL (ref 8.5–10.1)
CHLORIDE SERPL-SCNC: 104 MMOL/L (ref 98–107)
CO2 SERPL-SCNC: 28 MMOL/L (ref 21–32)
CREAT SERPL-MCNC: 0.95 MG/DL (ref 0.55–1.02)
GFR ESTIMATION: 55
GLUCOSE SERPL-MCNC: 137 MG/DL (ref 74–106)
POTASSIUM SERPL-SCNC: 4.2 MMOL/L (ref 3.5–5.1)
PROT SERPL-MCNC: 7.1 G/DL (ref 6.4–8.2)
SODIUM BLD-SCNC: 138 MMOL/L (ref 131–143)
T4 FREE SP9 P CHAL SERPL-SCNC: 0.88 NG/DL (ref 0.76–1.46)
TSH SERPL DL<=0.005 MIU/L-ACNC: 2.72 UIU/ML (ref 0.36–3.74)

## 2023-08-23 PROCEDURE — 99215 PR OFFICE/OUTPT VISIT, EST, LEVL V, 40-54 MIN: ICD-10-PCS | Mod: S$GLB,,, | Performed by: INTERNAL MEDICINE

## 2023-08-23 PROCEDURE — 99215 OFFICE O/P EST HI 40 MIN: CPT | Mod: S$GLB,,, | Performed by: INTERNAL MEDICINE

## 2023-08-23 RX ORDER — MULTIVITAMIN
1 TABLET ORAL DAILY
COMMUNITY

## 2023-08-23 RX ORDER — CLOTRIMAZOLE AND BETAMETHASONE DIPROPIONATE 10; .64 MG/G; MG/G
CREAM TOPICAL
Qty: 15 G | Refills: 2 | Status: SHIPPED | OUTPATIENT
Start: 2023-08-23

## 2023-08-23 RX ORDER — DULOXETIN HYDROCHLORIDE 30 MG/1
30 CAPSULE, DELAYED RELEASE ORAL
COMMUNITY
Start: 2023-08-02 | End: 2023-08-23 | Stop reason: SDUPTHER

## 2023-08-23 RX ORDER — DILTIAZEM HYDROCHLORIDE 180 MG/1
CAPSULE, COATED, EXTENDED RELEASE ORAL
COMMUNITY
Start: 2023-06-19

## 2023-08-23 RX ORDER — METFORMIN HYDROCHLORIDE 500 MG/1
TABLET, EXTENDED RELEASE ORAL
COMMUNITY
Start: 2023-08-18 | End: 2023-11-13 | Stop reason: SDUPTHER

## 2023-08-23 RX ORDER — DULOXETIN HYDROCHLORIDE 30 MG/1
30 CAPSULE, DELAYED RELEASE ORAL DAILY
Qty: 90 CAPSULE | Refills: 1 | Status: SHIPPED | OUTPATIENT
Start: 2023-08-23 | End: 2023-11-13 | Stop reason: SDUPTHER

## 2023-08-23 NOTE — PROGRESS NOTES
"  Subjective:       Patient ID: Ana Garcia is a 89 y.o. female.    Chief Complaint: Establish Care (Former pt. Of Dr. Julio to est care.  Needs RF's of Duloxetine and clotrimazole/betamethazone cream.  C/o sleeping too much, sleeps about 14 hours a day.  Pt. Wants to know if you'd rather her see a "geriatric Dr"?  Pt. Saw on TV that they will have a covid combination vaccine, do you recommend that?  Pt. Has "long term care"  )    HPI    89 y.o. female with Active Diagnosis Review (HCC)     Chronic             HCC 18 - Diabetes with Chronic Complications     Type 2 diabetes mellitus with other specified complication, without   long-term current use of insulin [E11.69]     HCC 19 - Diabetes without Complication     Type 2 diabetes mellitus without complication, without long-term current   use of insulin [E11.9]     Type 2 diabetes mellitus, without long-term current use of insulin   [E11.9]     HCC 22 - Morbid Obesity     Severe obesity (BMI 35.0-39.9) with comorbidity [E66.01]      - Chronic Obstructive Pulmonary Disease     Chronic obstructive pulmonary disease, unspecified COPD type [J44.9]      - Exudative Macular Degeneration     Exudative age-related macular degeneration, bilateral, with active   choroidal neovascularization [H35.3231]           here to establish care.     Patient Care Team:  Anabelle Hale MD as PCP - General (Internal Medicine)  Israel Mae MD (Physical Medicine and Rehabilitation)  Carlos Munoz IV, MD (Cardiology)  Toshia Munoz PA (Pulmonary Disease)  Cheli Torres NP (Cardiology)  Vidhya Cartagena NP (Bone Health)  Cholo Mackey MD as Consulting Physician (Ophthalmology)  Aquilino Ram MD (Internal Medicine)  Mati Rodríguez MD (Sleep Medicine)  Rajesh Kaur MD (Ophthalmology)  Nnamdi Duvall MD (Ophthalmology)          Health Maintenance Topics with due status: Not Due       Topic Last Completion Date    Influenza Vaccine " 10/11/2022    Lipid Panel 06/07/2023    Hemoglobin A1c 06/07/2023    Aspirin/Antiplatelet Therapy 08/23/2023       Health Maintenance Due   Topic Date Due    Diabetes Urine Screening  Never done    Eye Exam  Never done    TETANUS VACCINE  11/19/2006    Shingles Vaccine (2 of 3) 07/15/2015    COVID-19 Vaccine (5 - Pfizer series) 03/28/2023         Medical Problems:      Patient Active Problem List   Diagnosis    Cough variant asthma    GERD without esophagitis    Hyperlipidemia    Coronary artery disease involving native coronary artery    Vitamin D deficiency    Renal insufficiency    Iron deficiency anemia    Generalized joint pain    Type 2 diabetes mellitus, without long-term current use of insulin    Sleep apnea    Osteoporosis    Severe obesity (BMI 35.0-39.9) with comorbidity    Chronic obstructive pulmonary disease, unspecified COPD type    Exudative age-related macular degeneration, bilateral, with active choroidal neovascularization    Depressive disorder       Current Outpatient Medications on File Prior to Visit   Medication Sig Dispense Refill    aspirin (ECOTRIN) 81 MG EC tablet Take 81 mg by mouth once daily.      blood sugar diagnostic (CONTOUR NEXT TEST STRIPS) Strp 1 strip by Misc.(Non-Drug; Combo Route) route once daily. 100 strip 3    blood-glucose meter (CONTOUR NEXT METER MISC) by Misc.(Non-Drug; Combo Route) route.      denosumab (PROLIA) 60 mg/mL Syrg Inject 60 mg into the skin every 6 (six) months.      diltiaZEM (CARDIZEM CD) 180 MG 24 hr capsule       glucosamine-chondroitin 500-400 mg tablet Take 1 tablet by mouth 3 (three) times daily.      isosorbide mononitrate (IMDUR) 60 MG 24 hr tablet Take 60 mg by mouth once daily.      ketorolac 0.5% (ACULAR) 0.5 % Drop Place 0.5 drops into both eyes once daily at 6am.      lancets (MICROLET LANCET MISC) Microlet Lancet   USE AS DIRECTED      losartan (COZAAR) 100 MG tablet Take 1 tablet (100 mg total) by mouth once daily. 90 tablet 3    metFORMIN  (GLUCOPHAGE-XR) 500 MG ER 24hr tablet       multivitamin (ONE DAILY MULTIVITAMIN) per tablet Take 1 tablet by mouth once daily.      ranibizumab (LUCENTIS) 0.5 mg/0.05 mL Soln injection 0.5 mg.      rosuvastatin (CRESTOR) 5 MG tablet TAKE 1 TABLET(5 MG) BY MOUTH EVERY DAY 90 tablet 0    [DISCONTINUED] DULoxetine (CYMBALTA) 30 MG capsule Take 30 mg by mouth.      esomeprazole (NEXIUM) 40 MG capsule Take 1 capsule (40 mg total) by mouth before breakfast. 90 capsule 3    [DISCONTINUED] diclofenac (CATAFLAM) 50 MG tablet Take 1 tablet (50 mg total) by mouth 3 (three) times daily as needed. (Patient taking differently: Take 75 mg by mouth 2 (two) times a day.) 270 tablet 3    [DISCONTINUED] diltiaZEM (TIAZAC) 180 MG Cs24 Take 180 mg by mouth.      [DISCONTINUED] docusate sodium (COLACE) 100 MG capsule Take 100 mg by mouth 2 (two) times daily.      [DISCONTINUED] gabapentin (NEURONTIN) 300 MG capsule TAKE 1 CAPSULE BY MOUTH EVERY DAY AT BEDTIME      [DISCONTINUED] ueowajze-imwp-lya7-C-kenya-bosw 750 mg-644 mg- 30 mg-1 mg Tab Take 750 mg by mouth once daily at 6am.      [DISCONTINUED] metFORMIN (GLUCOPHAGE) 500 MG tablet TAKE 1 TABLET(500 MG) BY MOUTH DAILY WITH BREAKFAST 90 tablet 3    [DISCONTINUED] nitroGLYCERIN (NITROSTAT) 0.4 MG SL tablet Place 0.4 mg under the tongue every 5 (five) minutes as needed for Chest pain.      [DISCONTINUED] ranibizumab (LUCENTIS) 0.5 mg/0.05 mL Soln injection 0.5 mg.      [DISCONTINUED] SEREVENT DISKUS 50 mcg/dose diskus inhaler TAKE 1 PUFF BY MOUTH TWICE A DAY      [DISCONTINUED] triamterene-hydrochlorothiazide 37.5-25 mg (DYAZIDE) 37.5-25 mg per capsule TAKE 1 CAPSULE BY MOUTH EVERY MORNING 90 capsule 0     No current facility-administered medications on file prior to visit.           Past Medical History:   Diagnosis Date    Chronic airway obstruction     Diabetes mellitus, type 2     Fatigue     GERD (gastroesophageal reflux disease)     Heart disease     Hip pain     Hypertension      Incontinence     IPMN (intraductal papillary mucinous neoplasm)     Macular degeneration     Malaise     Nerve root inflammation     Osteoporosis     Sleep apnea     Venous insufficiency     Yeast infection      Past Surgical History:   Procedure Laterality Date    BREAST LUMPECTOMY Right     CATARACT EXTRACTION  2001 and 2002    cystocopy      FEMUR SURGERY      TANDEM AND OVOID INSERTION      TOTAL HIP ARTHROPLASTY       History reviewed. No pertinent family history.  Social History     Socioeconomic History    Marital status:    Tobacco Use    Smoking status: Never     Passive exposure: Never    Smokeless tobacco: Never   Substance and Sexual Activity    Alcohol use: Not Currently    Drug use: Never     Review of patient's allergies indicates:   Allergen Reactions    Adhesive tape-silicones     Codeine     Gabapentin     Iodine and iodide containing products     Penicillins     Sulfa (sulfonamide antibiotics)     Tegretol [carbamazepine]        Current Outpatient Medications:     aspirin (ECOTRIN) 81 MG EC tablet, Take 81 mg by mouth once daily., Disp: , Rfl:     blood sugar diagnostic (CONTOUR NEXT TEST STRIPS) Strp, 1 strip by Misc.(Non-Drug; Combo Route) route once daily., Disp: 100 strip, Rfl: 3    blood-glucose meter (CONTOUR NEXT METER MISC), by Misc.(Non-Drug; Combo Route) route., Disp: , Rfl:     denosumab (PROLIA) 60 mg/mL Syrg, Inject 60 mg into the skin every 6 (six) months., Disp: , Rfl:     diltiaZEM (CARDIZEM CD) 180 MG 24 hr capsule, , Disp: , Rfl:     glucosamine-chondroitin 500-400 mg tablet, Take 1 tablet by mouth 3 (three) times daily., Disp: , Rfl:     isosorbide mononitrate (IMDUR) 60 MG 24 hr tablet, Take 60 mg by mouth once daily., Disp: , Rfl:     ketorolac 0.5% (ACULAR) 0.5 % Drop, Place 0.5 drops into both eyes once daily at 6am., Disp: , Rfl:     lancets (MICROLET LANCET MISC), Microlet Lancet  USE AS DIRECTED, Disp: , Rfl:     losartan (COZAAR) 100 MG tablet, Take 1 tablet (100  "mg total) by mouth once daily., Disp: 90 tablet, Rfl: 3    metFORMIN (GLUCOPHAGE-XR) 500 MG ER 24hr tablet, , Disp: , Rfl:     multivitamin (ONE DAILY MULTIVITAMIN) per tablet, Take 1 tablet by mouth once daily., Disp: , Rfl:     ranibizumab (LUCENTIS) 0.5 mg/0.05 mL Soln injection, 0.5 mg., Disp: , Rfl:     rosuvastatin (CRESTOR) 5 MG tablet, TAKE 1 TABLET(5 MG) BY MOUTH EVERY DAY, Disp: 90 tablet, Rfl: 0    clotrimazole-betamethasone 1-0.05% (LOTRISONE) cream, AAA bid prn rash, Disp: 15 g, Rfl: 2    DULoxetine (CYMBALTA) 30 MG capsule, Take 1 capsule (30 mg total) by mouth once daily., Disp: 90 capsule, Rfl: 1    esomeprazole (NEXIUM) 40 MG capsule, Take 1 capsule (40 mg total) by mouth before breakfast., Disp: 90 capsule, Rfl: 3        Review of Systems   Constitutional:  Positive for fatigue. Negative for fever.   HENT:  Positive for hearing loss. Negative for trouble swallowing.    Eyes:  Positive for visual disturbance.   Respiratory:  Positive for shortness of breath. Negative for cough.    Cardiovascular:  Positive for chest pain (gerd/hiatal hernia - reports cardiologist stated okay from cardiac perspective). Negative for leg swelling.   Gastrointestinal:  Positive for abdominal pain (GERD/ hiatal hernia). Negative for blood in stool.   Genitourinary:  Positive for enuresis and urgency. Negative for difficulty urinating and dysuria.   Musculoskeletal:  Positive for arthralgias, back pain, gait problem and myalgias.   Skin:  Negative for pallor.   Neurological:  Negative for syncope and weakness.   Psychiatric/Behavioral:  The patient is nervous/anxious.        Objective:        Vitals:    08/23/23 1311   BP: 120/78   BP Location: Left arm   Patient Position: Sitting   BP Method: Large (Manual)   Pulse: 87   Resp: 15   Temp: 97.3 °F (36.3 °C)   TempSrc: Temporal   SpO2: 97%   Weight: 90.4 kg (199 lb 3.2 oz)   Height: 5' 3" (1.6 m)       Body mass index is 35.29 kg/m².    Physical Exam  Constitutional:       " General: She is not in acute distress.     Appearance: She is well-developed. She is not diaphoretic.   HENT:      Head: Normocephalic and atraumatic.      Right Ear: External ear normal.      Left Ear: External ear normal.   Cardiovascular:      Rate and Rhythm: Normal rate and regular rhythm.   Pulmonary:      Effort: Pulmonary effort is normal.      Breath sounds: Normal breath sounds. No wheezing.   Abdominal:      General: There is no distension.      Palpations: Abdomen is soft.   Musculoskeletal:      Cervical back: Neck supple.      Right lower leg: No edema.      Left lower leg: No edema.   Skin:     General: Skin is warm and dry.      Nails: There is no clubbing.   Neurological:      Mental Status: She is alert.      Gait: Gait abnormal (using walker).   Psychiatric:         Behavior: Behavior normal.         Judgment: Judgment normal.         Assessment:     1. Encounter to establish care    2. Type 2 diabetes mellitus with other specified complication, without long-term current use of insulin    3. Hyperlipidemia, unspecified hyperlipidemia type    4. Osteoporosis, unspecified osteoporosis type, unspecified pathological fracture presence    5. Sleep apnea, unspecified type    6. Fatigue, unspecified type    7. Severe obesity (BMI 35.0-39.9) with comorbidity    8. Chronic obstructive pulmonary disease, unspecified COPD type    9. Vitamin D deficiency    10. Exudative age-related macular degeneration, bilateral, with active choroidal neovascularization    11. Depressive disorder    12. Other chronic pain    13. Rash           Plan:         1. Encounter to establish care  - reviewed healthcare maintenance and pt's chronic medical problems.   - treatment team updated  - external records reviewed  - POC for specialists reviewed  - results and notes via care everywhere reviewed  - labs reviewed, ordered  - immunizations reviewed    2. Type 2 diabetes mellitus with other specified complication, without  long-term current use of insulin  - a1c at goal 7.0 June 2023.   - metFORMIN (GLUCOPHAGE-XR) 500 MG ER 24hr tablet  - Comprehensive Metabolic Panel; Future  - Microalbumin/Creatinine Ratio, Urine  - Comprehensive Metabolic Panel    3. Hyperlipidemia, unspecified hyperlipidemia type  - rosuvastatin 5mg daily    4. Osteoporosis, unspecified osteoporosis type, unspecified pathological fracture presence  - prolia per Vidhya Cartagena, NP  - Comprehensive Metabolic Panel; Future  - Vitamin D    5. Sleep apnea, unspecified type  - has CPAP from approx 12 years ago. Compliant w/ cpap. Still has significant fatigue, will refer to Dr. Hutchinson.  - TSH; Future  - T4, Free; Future  - Ambulatory referral/consult to Pulmonology; Future  - T4, Free  - TSH    6. Fatigue, unspecified type  - eval Susan/cpap   - Comprehensive Metabolic Panel; Future  - TSH; Future  - T4, Free; Future  - T4, Free  - TSH  - Comprehensive Metabolic Panel    7. Severe obesity (BMI 35.0-39.9) with comorbidity  - encourage weight loss through healthy lifestyle including diet and exercise  - TSH; Future  - T4, Free; Future    8. Chronic obstructive pulmonary disease, unspecified COPD type  - seen outside pulmonology in past. Reportedly normal PFTs. Several inhalers in past, some w/ benefit, some worsened. Will refer to pulm for further eval and tx  - Ambulatory referral/consult to Pulmonology; Future    9. Vitamin D deficiency    - Vitamin D    10. Exudative age-related macular degeneration, bilateral, with active choroidal neovascularization  - follows w/ ophtho    11. Depressive disorder    - DULoxetine (CYMBALTA) 30 MG capsule; Take 1 capsule (30 mg total) by mouth once daily.  Dispense: 90 capsule; Refill: 1    12. Other chronic pain    - DULoxetine (CYMBALTA) 30 MG capsule; Take 1 capsule (30 mg total) by mouth once daily.  Dispense: 90 capsule; Refill: 1    13. Rash  - topical cream prn. Refill sent  - clotrimazole-betamethasone 1-0.05% (LOTRISONE) cream;  AAA bid prn rash  Dispense: 15 g; Refill: 2              Unless there are intervening problems, Follow up in about 6 months (around 2/23/2024), or if symptoms worsen or fail to improve, for follow up.      Patient note was created using MModal Dictation.  Any errors in syntax or even information may not have been identified and edited on initial review prior to signing this note.    I spent a total of 55 minutes on the day of the visit.        This includes face to face time and non-face to face time preparing to see the patient (eg, review of tests), obtaining and/or reviewing separately obtained history, documenting clinical information in the electronic or other health record, independently interpreting results and communicating results to the patient/family/caregiver, or care coordinator.

## 2023-08-24 ENCOUNTER — TELEPHONE (OUTPATIENT)
Dept: FAMILY MEDICINE | Facility: CLINIC | Age: 88
End: 2023-08-24
Payer: MEDICARE

## 2023-08-24 NOTE — TELEPHONE ENCOUNTER
Please let pt know that diabetes urine screen wasn't completed at lab. She can come back to give sample at her convenience (order should be good still?) or we can do at her next clinic apt

## 2023-08-24 NOTE — PROGRESS NOTES
Please call patient with test results:  Vitamin D at lower range of normal. Recommend taking at least otc vit d3 2,000 IU daily for bone health. Calcium also in normal range. * if pt needs results for her bone health provider, can print or fax to Vidhya Cartagena NP - whatever pt prefers.  Kidney function stable.  Thyroid labs normal.  * can we check path lab for urine microalbumin results?

## 2023-08-24 NOTE — TELEPHONE ENCOUNTER
"Spoke with [facility] ([facility]).  "No urine ordered and none received in sample".  "

## 2023-08-29 ENCOUNTER — PATIENT OUTREACH (OUTPATIENT)
Dept: ADMINISTRATIVE | Facility: HOSPITAL | Age: 88
End: 2023-08-29
Payer: MEDICARE

## 2023-09-01 ENCOUNTER — TELEPHONE (OUTPATIENT)
Dept: FAMILY MEDICINE | Facility: CLINIC | Age: 88
End: 2023-09-01
Payer: MEDICARE

## 2023-09-07 ENCOUNTER — PATIENT OUTREACH (OUTPATIENT)
Dept: ADMINISTRATIVE | Facility: HOSPITAL | Age: 88
End: 2023-09-07
Payer: MEDICARE

## 2023-09-07 NOTE — LETTER
AUTHORIZATION FOR RELEASE OF   CONFIDENTIAL INFORMATION    [id number]    We are seeing Ana Garcia, date of birth 1931, in the clinic at [facility]. Anabelle Hale MD is the patient's PCP. Ana Garcia has an outstanding lab/procedure at the time we reviewed her chart. In order to help keep her health information updated, she has authorized us to request the following medical record(s):       ( x ) DIABETIC EYE EXAM 2023                    Please fax records to [facility], Paulk, Emily M, MD, [phone]     If you have any questions, please contact    JENARO Smith at [phone]          Patient Name: Ana Garcia  : 1931  Patient Phone #: [phone]

## 2023-09-07 NOTE — LETTER
AUTHORIZATION FOR RELEASE OF   CONFIDENTIAL INFORMATION    Dr. Duvall    We are seeing Ana Garcia, date of birth 1931, in the clinic at [facility]. Anabelle Hale MD is the patient's PCP. Ana Garcia has an outstanding lab/procedure at the time we reviewed her chart. In order to help keep her health information updated, she has authorized us to request the following medical record(s):        ( x ) DIABETIC EYE EXAM                    Please fax records to [facility], Paulk, Emily M, MD, [phone]     If you have any questions, please contact    JENARO Smith at [phone]          Patient Name: Ana Garcia  : 1931  Patient Phone #: [phone]

## 2023-09-11 NOTE — PROGRESS NOTES
Manually uploaded EYE EXAM 8/3/2023 to media    NOE faxed to Dr. Eloina goodwin to request 5/25/2023 dm eye exam records. Remind me set 1 week.

## 2023-09-14 NOTE — PROGRESS NOTES
2nd attempt  NOE faxed to Dr. Duvall 1x to request 5/25/2023 dm eye exam records. Remind me set 1 week.

## 2023-09-18 NOTE — PROGRESS NOTES
Called Dr. Duvall's office to request last dm eye exam on file - Spoke with staff who states that she is not seeing a dm eye exam but she is faxing the Feb and March 2023 records over. Reminder set 1 week.

## 2023-09-21 NOTE — PROGRESS NOTES
Manually uploaded and hyper-linked DM EYE EXAM 2/2/2023  Manually hyper-linked DM EYE EXAM  5/25/2023 & 8/3/2023 to HM

## 2023-10-16 ENCOUNTER — TELEPHONE (OUTPATIENT)
Dept: FAMILY MEDICINE | Facility: CLINIC | Age: 88
End: 2023-10-16
Payer: MEDICARE

## 2023-10-16 NOTE — TELEPHONE ENCOUNTER
----- Message from Ariela Key sent at 10/13/2023  1:10 PM CDT -----  Patient is requesting a call back from nurse....Please call her back at [phone].            Thanks  guy

## 2023-10-16 NOTE — TELEPHONE ENCOUNTER
Pt states that Dr. Hutchinson can't see her until Jan. (Appt melissa'd).  Pt. States her condition is worsening and feels weak.  Feels like she needs to see someone to check her out.  Advised pt. To go to [facility] for eval and tx.  Pt. Verbalized understanding.

## 2023-10-19 ENCOUNTER — OFFICE VISIT (OUTPATIENT)
Dept: URGENT CARE | Facility: CLINIC | Age: 88
End: 2023-10-19
Payer: MEDICARE

## 2023-10-19 VITALS
HEIGHT: 63 IN | BODY MASS INDEX: 35.26 KG/M2 | DIASTOLIC BLOOD PRESSURE: 78 MMHG | SYSTOLIC BLOOD PRESSURE: 128 MMHG | WEIGHT: 199 LBS | HEART RATE: 88 BPM | TEMPERATURE: 99 F | OXYGEN SATURATION: 97 %

## 2023-10-19 DIAGNOSIS — Z86.79 HISTORY OF CORONARY ARTERY DISEASE: ICD-10-CM

## 2023-10-19 DIAGNOSIS — R07.9 EXERTIONAL CHEST PAIN: Primary | ICD-10-CM

## 2023-10-19 DIAGNOSIS — Z86.39 HISTORY OF HYPERLIPIDEMIA: ICD-10-CM

## 2023-10-19 DIAGNOSIS — R94.31 ABNORMAL ELECTROCARDIOGRAM (ECG) (EKG): ICD-10-CM

## 2023-10-19 DIAGNOSIS — R06.09 DYSPNEA ON EXERTION: ICD-10-CM

## 2023-10-19 DIAGNOSIS — I45.2 BIFASCICULAR BLOCK: ICD-10-CM

## 2023-10-19 DIAGNOSIS — Z86.79 HISTORY OF HYPERTENSION: ICD-10-CM

## 2023-10-19 DIAGNOSIS — Z86.39 HISTORY OF TYPE 2 DIABETES MELLITUS: ICD-10-CM

## 2023-10-19 PROCEDURE — 71046 X-RAY EXAM CHEST 2 VIEWS: CPT | Mod: 26,,, | Performed by: RADIOLOGY

## 2023-10-19 PROCEDURE — 99215 OFFICE O/P EST HI 40 MIN: CPT | Mod: S$GLB,,, | Performed by: NURSE PRACTITIONER

## 2023-10-19 PROCEDURE — 71046 XR CHEST PA AND LATERAL: ICD-10-PCS | Mod: TC,,, | Performed by: NURSE PRACTITIONER

## 2023-10-19 PROCEDURE — 93005 ELECTROCARDIOGRAM TRACING: CPT | Mod: S$GLB,,, | Performed by: NURSE PRACTITIONER

## 2023-10-19 PROCEDURE — 71046 X-RAY EXAM CHEST 2 VIEWS: CPT | Mod: TC,,, | Performed by: NURSE PRACTITIONER

## 2023-10-19 PROCEDURE — 93005 EKG 12-LEAD: ICD-10-PCS | Mod: S$GLB,,, | Performed by: NURSE PRACTITIONER

## 2023-10-19 PROCEDURE — 93010 EKG 12-LEAD: ICD-10-PCS | Mod: S$PBB,,, | Performed by: INTERNAL MEDICINE

## 2023-10-19 PROCEDURE — 71046 XR CHEST PA AND LATERAL: ICD-10-PCS | Mod: 26,,, | Performed by: RADIOLOGY

## 2023-10-19 PROCEDURE — 99215 PR OFFICE/OUTPT VISIT, EST, LEVL V, 40-54 MIN: ICD-10-PCS | Mod: S$GLB,,, | Performed by: NURSE PRACTITIONER

## 2023-10-19 PROCEDURE — 93010 ELECTROCARDIOGRAM REPORT: CPT | Mod: S$PBB,,, | Performed by: INTERNAL MEDICINE

## 2023-10-19 NOTE — PATIENT INSTRUCTIONS
Please follow up with your primary care provider within 2-5 days if your signs and symptoms have not resolved or worsen.     If your condition worsens or fails to improve we recommend that you receive another evaluation at the emergency room immediately or contact your primary medical clinic to discuss your concerns.   You must understand that you have received an Urgent Care treatment only and that you may be released before all of your medical problems are known or treated. You, the patient, will arrange for follow up care as instructed.       I spoke with Dr Pink's NP, Kathy Le who agrees with the plan for ER evaluation to rule out cardiac causes of chest pain.     Please go to [facility]

## 2023-10-19 NOTE — PROGRESS NOTES
"Subjective:      Patient ID: Ana Garcia is a 89 y.o. female.    Vitals:  height is 5' 3" (1.6 m) and weight is 90.3 kg (199 lb). Her temperature is 98.6 °F (37 °C). Her blood pressure is 128/78 and her pulse is 88. Her oxygen saturation is 97%.     Chief Complaint: Chest Pain    Patient complains of intermittent chest pain. Her pain is anterior central chest and radiates to the left side. She states the pain is a heaviness feeling and is sometimes sharp. She has no history of heart issues or copd. The pain happens at rest and also during activity. Dr. Calin Munoz is her cardiologist. She also complains of exertional sob. Her PCP is Dr. Hale and referred her to a pulmonologist but her appointment isn't until January 2024.    Intermittent mid-left sided chest heaviness that is non radiating. She reports exacerbation of discomfort associated with minimal exertion and is relieved with rest. She also c/o GOLDBERG especially with ambulating ~30 ft. Denies lower ext edema. She has noticed intermittent "speeding up of heart rate".   Denies nasuea/diaphoresis.  Reports chronic cough which has slightly changed to become productive of clear sputum.  Took ASA today    Chest Pain   This is a new problem. The onset quality is sudden. The problem occurs intermittently. The problem has been gradually worsening. The pain is present in the substernal region and lateral region. The pain is at a severity of 5/10. The pain is moderate. The quality of the pain is described as heavy and sharp. Associated symptoms include dizziness, malaise/fatigue, near-syncope, palpitations and shortness of breath.       Cardiovascular:  Positive for chest pain and palpitations.   Respiratory:  Positive for shortness of breath.    Neurological:  Positive for dizziness.      Objective:     Physical Exam   Constitutional: She is oriented to person, place, and time.  Non-toxic appearance. She does not appear ill. No distress.   HENT:   Head: " Normocephalic and atraumatic.   Nose: Nose normal.   Mouth/Throat: Mucous membranes are moist.   Cardiovascular: Normal rate and regular rhythm.   Murmur heard.  Systolic murmur is present with a grade of 3/6.  Pulmonary/Chest: She has wheezes in the right lower field and the left lower field.   Fine Bibasilar inspiratory wheezes present.  Appears mildly dyspneic upon return to exam room after ambulating short distance in hallway.  Respiratory effort/rate returns to normal after seated for brief period of time ~1-2 min)  Is able to speak in full sentences.           Comments: Fine Bibasilar inspiratory wheezes present.  Appears mildly dyspneic upon return to exam room after ambulating short distance in hallway.  Respiratory effort/rate returns to normal after seated for brief period of time ~1-2 min)  Is able to speak in full sentences.      Abdominal: Normal appearance.   Musculoskeletal: Normal range of motion.         General: Normal range of motion.      Right lower le+ Edema present.      Left lower le+ Edema present.   Neurological: no focal deficit. She is alert, oriented to person, place, and time and at baseline.   Skin: Skin is warm, dry and not diaphoretic.   Psychiatric: Her behavior is normal. Mood, judgment and thought content normal.   Nursing note and vitals reviewed.chaperone present (son is present for exam)         Assessment:     1. Exertional chest pain    2. Dyspnea on exertion    3. Abnormal electrocardiogram (ECG) (EKG)    4. Bifascicular block        Plan:       Exertional chest pain  -     IN OFFICE EKG 12-LEAD (to [facility])  -     XR CHEST PA AND LATERAL; Future; Expected date: 10/19/2023    Dyspnea on exertion  -     IN OFFICE EKG 12-LEAD (to [facility])  -     XR CHEST PA AND LATERAL; Future; Expected date: 10/19/2023    Abnormal electrocardiogram (ECG) (EKG)    Bifascicular block          Medical Decision Making:   History:   I obtained history from: someone other than patient.       <>  "Summary of History: Son assists pt in providing historical data.  Old Records Summarized: records from clinic visits.       <> Summary of Records: Reviewed prior pcp encounter notes for accurate PmHx  Clinical Tests:   Medical Tests: Ordered  Urgent Care Management:  Pt denies PmHx CAD, CHF, or COPD although these diagnoses are listed in her PmHx in EMR upon medical records review.  Discussion at length with pt and her son regarding her HPI, test results, and need for further workup to evaluate for ACS, ?EKG changes. Pt seems to minimize her symptoms with frequent verbalization that "they told me my heart is fine so it's got to be something else". I spent a great deal of time educating her and her son on the importance of further diagnostic evaluation which would require completion in the ER setting, however pt politely resists this recommendation. She requested that I reach out to her cardiology provider to discuss case. Pt eventually agreed to go to [facility] ER. She prefers her son to transport her over EMS transport.        The EKG shows a normal, regular Sinus Rhythm, at a rate of 83 Bpm, there are not ST Changes. Right bundle branch block. Left fascicular block. Bifasicular block. Abnormal EKG.There is not a previous EKG for comparison. This EKG was preliminarily interpreted by me in advance of the cardiologist's interpretation.      Other:   I have discussed this case with another health care provider.       <> Summary of the Discussion: Detailed case discussion with Dr Pink's NP, Kathy Le at pt's request. She agrees with my recommendation for ER evaluation for cardiac workup, telemetry monitoring, and treatment as indicated given her concerning HPI, and symptomatic EKG abnormalities. If pt is admitted, she will round on her in the morning, otherwise she will reach out to pt tomorrow am if discharged to check on her.  The details of this conversation were communicated to pt " and her son who verbalize understanding. Pt is [finally] in agreement, albeit hesitant, with my recommendation for further evaluation requiring higher level of care. Pt's prefers her son to transport her to [facility] ER.

## 2023-10-20 ENCOUNTER — TELEPHONE (OUTPATIENT)
Dept: FAMILY MEDICINE | Facility: CLINIC | Age: 88
End: 2023-10-20
Payer: MEDICARE

## 2023-10-20 NOTE — TELEPHONE ENCOUNTER
----- Message from Lavern Oviedo sent at 10/20/2023 11:13 AM CDT -----  Contact: Ana  .Patient is calling to speak with the nurse regarding appt  . Reports was in the ER and was told to follow up with 2-7 days . Please give patient a call back at .[phone].

## 2023-10-20 NOTE — TELEPHONE ENCOUNTER
"Pt. Was seen in [facility] ER for dyspnea, did cardiac work up.  Pt. States "everything was good".  Waiting on Dr. Munoz's (cardiology) office to call her also.      Has np appt with Dr. Hutchinson in January.    Pts states paperwork advises her to f/u with PCP.  Does pt. Need f/u with you?  Pt. Wants to know.       Our next opening isn't until 11/15.    Please advise.  "

## 2023-10-27 ENCOUNTER — TELEPHONE (OUTPATIENT)
Dept: FAMILY MEDICINE | Facility: CLINIC | Age: 88
End: 2023-10-27
Payer: MEDICARE

## 2023-10-27 NOTE — TELEPHONE ENCOUNTER
----- Message from Danette Villar sent at 10/27/2023 11:25 AM CDT -----  Contact: Patient  Patient called to consult with nurse or staff regarding a missed call from the clinic on 10/26. She would like a call back and can be reached at [phone] or [phone]. Thanks/MR

## 2023-10-27 NOTE — TELEPHONE ENCOUNTER
Advised pt. To see Dr. Munoz first.  Pt. States they have not contacted her to [facility]'d f/u appt.  Advised pt. To call them to [facility]'d appt and let us know what they say.  Pt. Verbalized understanding.

## 2023-11-13 DIAGNOSIS — E11.69 TYPE 2 DIABETES MELLITUS WITH OTHER SPECIFIED COMPLICATION, WITHOUT LONG-TERM CURRENT USE OF INSULIN: ICD-10-CM

## 2023-11-13 DIAGNOSIS — G89.29 OTHER CHRONIC PAIN: ICD-10-CM

## 2023-11-13 DIAGNOSIS — F32.A DEPRESSIVE DISORDER: ICD-10-CM

## 2023-11-13 NOTE — TELEPHONE ENCOUNTER
----- Message from Kalyani Figueroa sent at 11/13/2023  9:45 AM CST -----  Regarding: Medication  Contact: patient  Per phone call with patient, she stated that she would like for the physician to give her a call regarding all of her medication with the new physician.  Please return call at [phone]  The pharmacy will get in contact with you regarding her Med formin that she takes once's a day.    Thanks,  SJ

## 2023-11-14 RX ORDER — DULOXETIN HYDROCHLORIDE 30 MG/1
30 CAPSULE, DELAYED RELEASE ORAL DAILY
Qty: 90 CAPSULE | Refills: 1 | Status: SHIPPED | OUTPATIENT
Start: 2023-11-14 | End: 2024-02-22 | Stop reason: DRUGHIGH

## 2023-11-14 RX ORDER — METFORMIN HYDROCHLORIDE 500 MG/1
500 TABLET, EXTENDED RELEASE ORAL DAILY
Qty: 90 TABLET | Refills: 1 | Status: SHIPPED | OUTPATIENT
Start: 2023-11-14 | End: 2024-02-22 | Stop reason: SDUPTHER

## 2023-12-05 ENCOUNTER — TELEPHONE (OUTPATIENT)
Dept: FAMILY MEDICINE | Facility: CLINIC | Age: 88
End: 2023-12-05
Payer: MEDICARE

## 2023-12-05 NOTE — TELEPHONE ENCOUNTER
----- Message from Lavern Oviedo sent at 12/5/2023 12:44 PM CST -----  Contact: Ana  .Type:  Patient Returning Call    Who Called:Ana   Who Left Message for Patient:nurse   Does the patient know what this is regarding?:unknown   Would the patient rather a call back or a response via [facility]? Call   Best Call Back Number:.[phone]  Additional Information: Pt requesting a call back

## 2023-12-05 NOTE — TELEPHONE ENCOUNTER
----- Message from Danette Villar sent at 12/4/2023 10:54 AM CST -----  Contact: Patient  Patient called to consult with nurse or staff regarding a copy of her recent labs. She would like to have a record of these and would like to speak with clinic regarding this. She can be reached at [phone]. Thanks/MR

## 2024-01-09 ENCOUNTER — TELEPHONE (OUTPATIENT)
Dept: PULMONOLOGY | Facility: CLINIC | Age: 89
End: 2024-01-09
Payer: MEDICARE

## 2024-01-09 ENCOUNTER — TELEPHONE (OUTPATIENT)
Dept: FAMILY MEDICINE | Facility: CLINIC | Age: 89
End: 2024-01-09
Payer: MEDICARE

## 2024-01-09 NOTE — TELEPHONE ENCOUNTER
FYI:  Pt. Has appt with Dr. Hutchinson tomorrow and Xrays tomorrow.  Pt. Has seen Dr. Laboy for MRI on Hip.  Pt. Has cyst on pancreas but is melissa'd with a dr. Regarding this (Dr. Han Wilson).  Pt. Also has appt with neurologist (Dr. Nicolas Concepcion).  Pt. Also saw Dr. Munoz since seeing us.

## 2024-01-09 NOTE — TELEPHONE ENCOUNTER
----- Message from Cheryl Martinez sent at 1/9/2024  9:00 AM CST -----  Contact: self  Type:  Patient Returning Call    Who Called:Ana Garcia  Who Left Message for Patient:unsure  Does the patient know what this is regarding?:health questions/concerns  Would the patient rather a call back or a response via [facility]?   Best Call Back Number:[phone]  Additional Information: n/a

## 2024-02-05 RX ORDER — ISOSORBIDE MONONITRATE 60 MG/1
TABLET, EXTENDED RELEASE ORAL
Qty: 30 TABLET | Refills: 0 | Status: SHIPPED | OUTPATIENT
Start: 2024-02-05 | End: 2024-02-22 | Stop reason: SDUPTHER

## 2024-02-06 ENCOUNTER — TELEPHONE (OUTPATIENT)
Dept: FAMILY MEDICINE | Facility: CLINIC | Age: 89
End: 2024-02-06
Payer: MEDICARE

## 2024-02-06 NOTE — TELEPHONE ENCOUNTER
Pt. Saw GI  And is doing MRI w and w/o contrast on Friday and doing EGD next week in [place].  Appt with Dr. Hutchinson got cancelled, she is waiting for them to call her to Rs'd.  Seeing Dr. Laboy for hip pain/scheduled aspiration for this.

## 2024-02-06 NOTE — TELEPHONE ENCOUNTER
----- Message from Ana Linton sent at 2/6/2024  1:08 PM CST -----  Contact: pt  Pt calling for nurse about additional issues she is having and she can be reached at [phone].    Thanks,

## 2024-02-22 ENCOUNTER — OFFICE VISIT (OUTPATIENT)
Dept: FAMILY MEDICINE | Facility: CLINIC | Age: 89
End: 2024-02-22
Payer: MEDICARE

## 2024-02-22 VITALS
DIASTOLIC BLOOD PRESSURE: 80 MMHG | RESPIRATION RATE: 15 BRPM | TEMPERATURE: 97 F | OXYGEN SATURATION: 97 % | SYSTOLIC BLOOD PRESSURE: 120 MMHG | WEIGHT: 200 LBS | BODY MASS INDEX: 35.44 KG/M2 | HEART RATE: 77 BPM | HEIGHT: 63 IN

## 2024-02-22 DIAGNOSIS — H35.3231 EXUDATIVE AGE-RELATED MACULAR DEGENERATION, BILATERAL, WITH ACTIVE CHOROIDAL NEOVASCULARIZATION: ICD-10-CM

## 2024-02-22 DIAGNOSIS — E78.5 HYPERLIPIDEMIA, UNSPECIFIED HYPERLIPIDEMIA TYPE: ICD-10-CM

## 2024-02-22 DIAGNOSIS — E11.69 TYPE 2 DIABETES MELLITUS WITH OTHER SPECIFIED COMPLICATION, WITHOUT LONG-TERM CURRENT USE OF INSULIN: Primary | ICD-10-CM

## 2024-02-22 DIAGNOSIS — M25.50 GENERALIZED JOINT PAIN: ICD-10-CM

## 2024-02-22 DIAGNOSIS — I10 PRIMARY HYPERTENSION: ICD-10-CM

## 2024-02-22 DIAGNOSIS — E66.01 SEVERE OBESITY (BMI 35.0-39.9) WITH COMORBIDITY: ICD-10-CM

## 2024-02-22 DIAGNOSIS — J44.9 CHRONIC OBSTRUCTIVE PULMONARY DISEASE, UNSPECIFIED COPD TYPE: ICD-10-CM

## 2024-02-22 PROCEDURE — 99215 OFFICE O/P EST HI 40 MIN: CPT | Mod: S$GLB,,, | Performed by: INTERNAL MEDICINE

## 2024-02-22 RX ORDER — ISOSORBIDE MONONITRATE 60 MG/1
TABLET, EXTENDED RELEASE ORAL
Qty: 90 TABLET | Refills: 1 | Status: SHIPPED | OUTPATIENT
Start: 2024-02-22

## 2024-02-22 RX ORDER — ROSUVASTATIN CALCIUM 5 MG/1
5 TABLET, COATED ORAL DAILY
Qty: 90 TABLET | Refills: 1 | Status: SHIPPED | OUTPATIENT
Start: 2024-02-22

## 2024-02-22 RX ORDER — ASPIRIN 81 MG/1
1 TABLET ORAL DAILY
COMMUNITY

## 2024-02-22 RX ORDER — DULOXETIN HYDROCHLORIDE 20 MG/1
40 CAPSULE, DELAYED RELEASE ORAL DAILY
Qty: 180 CAPSULE | Refills: 1 | Status: SHIPPED | OUTPATIENT
Start: 2024-02-22 | End: 2024-05-06 | Stop reason: DRUGHIGH

## 2024-02-22 RX ORDER — METFORMIN HYDROCHLORIDE 500 MG/1
500 TABLET, EXTENDED RELEASE ORAL DAILY
Qty: 90 TABLET | Refills: 1 | Status: SHIPPED | OUTPATIENT
Start: 2024-02-22

## 2024-02-22 NOTE — PROGRESS NOTES
Subjective:       Patient ID: Ana Garcia is a 89 y.o. female.    Chief Complaint: Follow-up (Pt. Here for 6mth f/u.  Pt. Has list fo c/o to discuss with you today.)    HPI    89 y.o. female with Active Diagnosis Review (HCC)     Chronic             HCC 18 - Diabetes with Chronic Complications     Type 2 diabetes mellitus with other specified complication, without   long-term current use of insulin [E11.69]     HCC 22 - Morbid Obesity     Severe obesity (BMI 35.0-39.9) with comorbidity [E66.01]      - Chronic Obstructive Pulmonary Disease     Chronic obstructive pulmonary disease, unspecified COPD type [J44.9]      - Exudative Macular Degeneration     Exudative age-related macular degeneration, bilateral, with active   choroidal neovascularization [H35.3231]          Suspect             HCC 19 - Diabetes without Complication     Type 2 diabetes mellitus without complication, without long-term current   use of insulin [E11.9]     Type 2 diabetes mellitus, without long-term current use of insulin   [E11.9]           presents for follow up of chronic medical problems. Her friend Lesley is here with her today to help provide history.    DM2: metformin 500mg daily    HTN: losartan 100mg daily    HLd: rosuvastatin 5 mg    Chronic dyspnea: saw cardiology December 2023. Has f/u apt next month. Was never able to see pulmonology d/t apt cancellation, would like new referral.  Recent EGD: hiatal hernia. GI suggested not doing surgery. Dr. Beltran. She brings in copy of EGD report today.  - also noted pancreatic cyst. GI recommended routine/serial imaging monitoring as no suspicious features to necessitate biopsy per pt report  Chronic pain/ generalized weakness: sees neurology in [place] who started cymbalta 20mg -> 30mg. Had recommended increase to 60mg, however pt hesitant to increase past 30mg at that time. She would now like to increase to 40mg.  - sees pain Dr. Mae for SARAI's  - ortho Dr. Laboy for  "chronic hip pain  - plans to start outpatient PT per Neuro    Review of Systems   Constitutional:  Negative for fever.   HENT:  Positive for hearing loss. Negative for trouble swallowing.    Respiratory:  Positive for chest tightness and shortness of breath.    Cardiovascular:  Negative for chest pain.   Gastrointestinal:  Positive for abdominal pain. Negative for vomiting.   Genitourinary:  Negative for decreased urine volume.   Musculoskeletal:  Positive for arthralgias, back pain, gait problem and myalgias.   Skin:  Negative for wound.   Neurological:  Negative for syncope.   Psychiatric/Behavioral:  The patient is nervous/anxious.        Objective:        Vitals:    02/22/24 1310   BP: 120/80   BP Location: Left arm   Patient Position: Sitting   BP Method: Large (Manual)   Pulse: 77   Resp: 15   Temp: 96.9 °F (36.1 °C)   TempSrc: Temporal   SpO2: 97%   Weight: 90.7 kg (200 lb)   Height: 5' 3" (1.6 m)       Body mass index is 35.43 kg/m².    Physical Exam  Constitutional:       General: She is not in acute distress.     Appearance: She is well-developed. She is not diaphoretic.   HENT:      Head: Normocephalic and atraumatic.      Right Ear: External ear normal. Decreased hearing noted.      Left Ear: External ear normal. Decreased hearing noted.   Cardiovascular:      Rate and Rhythm: Normal rate and regular rhythm.      Heart sounds: Murmur heard.   Pulmonary:      Effort: Pulmonary effort is normal.      Breath sounds: Normal breath sounds. No wheezing or rales.   Abdominal:      General: There is no distension.      Palpations: Abdomen is soft.   Musculoskeletal:      Cervical back: Neck supple.      Right lower leg: No edema.      Left lower leg: No edema.   Skin:     General: Skin is warm and dry.      Nails: There is no clubbing.   Neurological:      Mental Status: She is alert. Mental status is at baseline.      Gait: Gait abnormal (using walker).   Psychiatric:         Behavior: Behavior normal.         " Judgment: Judgment normal.         Assessment:     1. Type 2 diabetes mellitus with other specified complication, without long-term current use of insulin    2. Hyperlipidemia, unspecified hyperlipidemia type    3. Primary hypertension    4. Chronic obstructive pulmonary disease, unspecified COPD type    5. Generalized joint pain    6. Severe obesity (BMI 35.0-39.9) with comorbidity    7. Exudative age-related macular degeneration, bilateral, with active choroidal neovascularization           Plan:         1. Type 2 diabetes mellitus with other specified complication, without long-term current use of insulin  - good control. Update labs today.   - Comprehensive Metabolic Panel; Future  - Hemoglobin A1C; Future  - Microalbumin/Creatinine Ratio, Urine  - Hemoglobin A1C  - Comprehensive Metabolic Panel  - metFORMIN (GLUCOPHAGE-XR) 500 MG ER 24hr tablet; Take 1 tablet (500 mg total) by mouth once daily.  Dispense: 90 tablet; Refill: 1    2. Hyperlipidemia, unspecified hyperlipidemia type    - rosuvastatin (CRESTOR) 5 MG tablet; Take 1 tablet (5 mg total) by mouth once daily.  Dispense: 90 tablet; Refill: 1    3. Primary hypertension  - cont losartan  - controlled  - isosorbide mononitrate (IMDUR) 60 MG 24 hr tablet; Once a day  Dispense: 90 tablet; Refill: 1    4. Chronic obstructive pulmonary disease, unspecified COPD type    - Ambulatory referral/consult to Pulmonology; Future    5. Generalized joint pain  - increase cymbalta to 40mg per pt request  - DULoxetine (CYMBALTA) 20 MG capsule; Take 2 capsules (40 mg total) by mouth once daily.  Dispense: 180 capsule; Refill: 1    6. Severe obesity (BMI 35.0-39.9) with comorbidity  - encourage weight loss through healthy lifestyle including diet and exercise    7. Exudative age-related macular degeneration, bilateral, with active choroidal neovascularization  - chronic, stable. Management per ophtho.    External records including endoscopy report, external labs, and notes  personally reviewed.     Unless there are intervening problems, Follow up in about 6 months (around 8/22/2024), or if symptoms worsen or fail to improve, for follow up.      Patient note was created using MModal Dictation.  Any errors in syntax or even information may not have been identified and edited on initial review prior to signing this note.    I spent a total of 42 minutes on the day of the visit.  This includes face to face time and non-face to face time preparing to see the patient (eg, review of tests), obtaining and/or reviewing separately obtained history, documenting clinical information in the electronic or other health record, independently interpreting results and communicating results to the patient/family/caregiver, or care coordinator.

## 2024-02-23 LAB
ALBUMIN SERPL BCP-MCNC: 4.1 G/DL (ref 3.4–5)
ALP SERPL-CCNC: 65 U/L (ref 45–117)
ALT SERPL W P-5'-P-CCNC: 16 U/L (ref 13–56)
ANION GAP SERPL CALC-SCNC: 4 MMOL/L (ref 3–11)
AST SERPL-CCNC: 12 U/L (ref 15–37)
BILIRUB SERPL-MCNC: 0.4 MG/DL (ref 0.2–1)
BUN SERPL-MCNC: 18 MG/DL (ref 7–18)
BUN/CREAT SERPL: 23.07 RATIO
CALCIUM SERPL-MCNC: 9.9 MG/DL (ref 8.5–10.1)
CHLORIDE SERPL-SCNC: 105 MMOL/L (ref 98–107)
CO2 SERPL-SCNC: 29 MMOL/L (ref 21–32)
CREAT SERPL-MCNC: 0.78 MG/DL (ref 0.55–1.02)
CREATININE, URINE: 79.2 MG/DL (ref 10–175)
ESTIMATED AVG GLUCOSE: 168 MG/DL
GFR ESTIMATION: > 60
GLUCOSE SERPL-MCNC: 157 MG/DL (ref 74–106)
HBA1C MFR BLD: 6.9 % (ref 4.2–6.3)
MICROALBUMIN URINE: 11.2 MG/L (ref 0–20)
MICROALBUMIN/CREATININE RATIO: 14 MG/G (ref 0–30)
POTASSIUM SERPL-SCNC: 4.2 MMOL/L (ref 3.5–5.1)
PROT SERPL-MCNC: 6.7 G/DL (ref 6.4–8.2)
SODIUM BLD-SCNC: 138 MMOL/L (ref 131–143)

## 2024-02-27 NOTE — PROGRESS NOTES
Please call patient with test results:  A1c is stable showing good diabetes control. Continue metformin 500mg once daily.  Normal kidney function, liver enzymes.   Diabetes urine screen is negative (normal).

## 2024-03-04 DIAGNOSIS — J44.9 CHRONIC OBSTRUCTIVE PULMONARY DISEASE, UNSPECIFIED COPD TYPE: Primary | ICD-10-CM

## 2024-04-08 ENCOUNTER — CLINICAL SUPPORT (OUTPATIENT)
Dept: PULMONOLOGY | Facility: CLINIC | Age: 89
End: 2024-04-08
Payer: MEDICARE

## 2024-04-08 ENCOUNTER — HOSPITAL ENCOUNTER (OUTPATIENT)
Dept: RADIOLOGY | Facility: CLINIC | Age: 89
Discharge: HOME | End: 2024-04-08
Attending: INTERNAL MEDICINE
Payer: MEDICARE

## 2024-04-08 ENCOUNTER — OFFICE VISIT (OUTPATIENT)
Dept: PULMONOLOGY | Facility: CLINIC | Age: 89
End: 2024-04-08
Payer: MEDICARE

## 2024-04-08 VITALS
OXYGEN SATURATION: 97 % | HEART RATE: 71 BPM | SYSTOLIC BLOOD PRESSURE: 117 MMHG | WEIGHT: 199.94 LBS | RESPIRATION RATE: 18 BRPM | BODY MASS INDEX: 35.43 KG/M2 | HEIGHT: 63 IN | DIASTOLIC BLOOD PRESSURE: 71 MMHG

## 2024-04-08 DIAGNOSIS — J44.9 CHRONIC OBSTRUCTIVE PULMONARY DISEASE, UNSPECIFIED COPD TYPE: ICD-10-CM

## 2024-04-08 DIAGNOSIS — R05.8 UPPER AIRWAY COUGH SYNDROME: ICD-10-CM

## 2024-04-08 DIAGNOSIS — G47.33 OSA ON CPAP: ICD-10-CM

## 2024-04-08 DIAGNOSIS — J44.9 CHRONIC OBSTRUCTIVE PULMONARY DISEASE, UNSPECIFIED COPD TYPE: Primary | ICD-10-CM

## 2024-04-08 DIAGNOSIS — R05.3 CHRONIC COUGH: ICD-10-CM

## 2024-04-08 DIAGNOSIS — R06.09 EXERTIONAL DYSPNEA: ICD-10-CM

## 2024-04-08 DIAGNOSIS — K44.9 HIATAL HERNIA: ICD-10-CM

## 2024-04-08 DIAGNOSIS — J45.991 COUGH VARIANT ASTHMA: Primary | ICD-10-CM

## 2024-04-08 PROCEDURE — 99205 OFFICE O/P NEW HI 60 MIN: CPT | Mod: 25,S$GLB,, | Performed by: INTERNAL MEDICINE

## 2024-04-08 PROCEDURE — 71046 X-RAY EXAM CHEST 2 VIEWS: CPT | Mod: TC,,, | Performed by: INTERNAL MEDICINE

## 2024-04-08 PROCEDURE — 94726 PLETHYSMOGRAPHY LUNG VOLUMES: CPT | Mod: S$GLB,,, | Performed by: INTERNAL MEDICINE

## 2024-04-08 PROCEDURE — 71046 X-RAY EXAM CHEST 2 VIEWS: CPT | Mod: 26,,, | Performed by: RADIOLOGY

## 2024-04-08 PROCEDURE — 94010 BREATHING CAPACITY TEST: CPT | Mod: S$GLB,,, | Performed by: INTERNAL MEDICINE

## 2024-04-08 PROCEDURE — 94729 DIFFUSING CAPACITY: CPT | Mod: S$GLB,,, | Performed by: INTERNAL MEDICINE

## 2024-04-08 RX ORDER — IPRATROPIUM BROMIDE 21 UG/1
2 SPRAY, METERED NASAL 2 TIMES DAILY
Qty: 5 ML | Refills: 3 | Status: SHIPPED | OUTPATIENT
Start: 2024-04-08

## 2024-04-08 NOTE — PROGRESS NOTES
Subjective:    Patient Identification:   Patient ID: Ana Garcia is a 89 y.o. female.    Referring Provider:  Dr. Anabelle Hale     Chief Complaint:  COPD    History of Present Illness:    Ana Garcia is a 89 y.o. female who presents elevation of above-mentioned problem.    Patient mentions that he has exertional dyspnea.  She does get short of breath with minimal activities.  About 14 years ago, while in [place] she was diagnosed with cough variant asthma and treated with Serevent with significant improvement in her symptoms.  She does have chronic cough.  She does complain of postnasal drainage but really feels that cough comes down from her lungs.  She does have gastroesophageal reflux disease and her symptoms are controlled with Nexium.  He does have hiatal hernia and he is being evaluated by a gastroenterologist in [place].  She does have obstructive sleep apnea it is on CPAP with good compliance as per her report.  She denies any nausea, vomiting, allergic symptoms otherwise.  He has no wheezing.  Her cough is mostly dry but sometimes associated with clear phlegm.  She saw Dr. Lauren Chau couple of years ago and was taken off of Serevent and placed on Wixela which made her symptoms worse and her primary care took her off all the inhalers.  He has been evaluated by Cardiology and was told that her heart was normal.  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  She has no occupational exposure history.  She has never smoked.  PFTs are not consistent with COPD.  Chest imaging is not consistent with COPD.    Review of Systems:  Review of Systems   Constitutional:  Negative for fever, chills, weight loss, weight gain, activity change, appetite change, fatigue, night sweats and weakness.   HENT:  Negative for nosebleeds, postnasal drip, rhinorrhea, sinus pressure, sore throat, trouble swallowing, voice change, congestion, ear pain and hearing loss.    Eyes:  Negative for redness  and itching.   Respiratory:  Positive for snoring, cough and dyspnea on extertion. Negative for hemoptysis, sputum production, choking, chest tightness, shortness of breath, wheezing, orthopnea, previous hospitialization due to pulmonary problems, asthma nighttime symptoms, pleurisy, use of rescue inhaler and Paroxysmal Nocturnal Dyspnea.    Cardiovascular:  Negative for chest pain, palpitations and leg swelling.   Genitourinary:  Negative for difficulty urinating and hematuria.   Endocrine:  Negative for polydipsia, polyphagia, cold intolerance, heat intolerance and polyuria.    Musculoskeletal:  Negative for arthralgias, back pain, gait problem, joint swelling and myalgias.   Skin:  Negative for rash.   Gastrointestinal:  Negative for nausea, vomiting, abdominal pain, abdominal distention and acid reflux.   Neurological:  Negative for dizziness, syncope, weakness, light-headedness and headaches.   Hematological:  Negative for adenopathy. Does not bruise/bleed easily and no excessive bruising.   Psychiatric/Behavioral:  Negative for confusion and sleep disturbance. The patient is not nervous/anxious.          Allergies:   Review of patient's allergies indicates:   Allergen Reactions    Adhesive tape-silicones     Codeine     Gabapentin     Iodine and iodide containing products     Penicillins     Sulfa (sulfonamide antibiotics)     Tegretol [carbamazepine]        Medications:      Past Medical History:      Past Medical History:   Diagnosis Date    Chronic airway obstruction     Diabetes mellitus, type 2     Fatigue     GERD (gastroesophageal reflux disease)     Heart disease     Hiatal hernia     Hip pain     Hypertension     Incontinence     IPMN (intraductal papillary mucinous neoplasm)     Macular degeneration     Malaise     Nerve root inflammation     Neuropathy     Osteoporosis     Pancreatic cyst     Sleep apnea     Venous insufficiency     Yeast infection        Family History:      History reviewed. No  pertinent family history.     Social History:      Past Surgical History:   Procedure Laterality Date    BREAST LUMPECTOMY Right     CATARACT EXTRACTION  2001 and 2002    cystocopy      ESOPHAGOGASTRODUODENOSCOPY  02/14/2024    FEMUR SURGERY      TANDEM AND OVOID INSERTION      TOTAL HIP ARTHROPLASTY         Physical Exam:  Vitals:    04/08/24 0856   BP: 117/71   Pulse: 71   Resp: 18     Physical Exam   Constitutional: She is oriented to person, place, and time. She appears not cachectic. No distress. She is obese.   HENT:   Head: Normocephalic.   Right Ear: External ear normal.   Left Ear: External ear normal.   Nose: Nose normal. No mucosal edema. No polyps.   Mouth/Throat: Oropharynx is clear and moist. Normal dentition. No oropharyngeal exudate.   Neck: No JVD present. No tracheal deviation present. No thyromegaly present.   Cardiovascular: Normal rate, regular rhythm, normal heart sounds and intact distal pulses. Exam reveals no gallop and no friction rub.   No murmur heard.  Pulmonary/Chest: Normal expansion, symmetric chest wall expansion, effort normal and breath sounds normal. No stridor. No respiratory distress. She has no decreased breath sounds. She has no wheezes. She has no rhonchi. She has no rales. Chest wall is not dull to percussion. She exhibits no tenderness. Negative for egophony. Negative for tactile fremitus.   Abdominal: Soft. Bowel sounds are normal. She exhibits no distension and no mass. There is no hepatosplenomegaly. There is no abdominal tenderness. There is no rebound and no guarding. No hernia.   Musculoskeletal:         General: No tenderness, deformity or edema. Normal range of motion.      Cervical back: Normal range of motion and neck supple.   Lymphadenopathy: No supraclavicular adenopathy is present.     She has no cervical adenopathy.     She has no axillary adenopathy.   Neurological: She is alert and oriented to person, place, and time. She has normal reflexes. She displays  normal reflexes. No cranial nerve deficit. She exhibits normal muscle tone.   Skin: Skin is warm and dry. No rash noted. She is not diaphoretic. No cyanosis or erythema. No pallor. Nails show no clubbing.   Psychiatric: She has a normal mood and affect. Her behavior is normal. Judgment and thought content normal.           Accessory Clinical Data:  Chest x-ray:  I personally reviewed the chest x-ray from today which shows hiatal hernia but clear lungs otherwise.    CT scan:     PFTs:  Normal profile    6MWT:     TTE:    Lab data:    All radiographic imaging of the chest, PFT tracings/data, and 6MWT data have been independently reviewed and interpreted unless otherwise specified.     Assessment and Plan:        Problem List Items Addressed This Visit          Pulmonary    Cough variant asthma - Primary    Relevant Medications    salmeteroL (SEREVENT) 50 mcg/dose diskus inhaler    Chronic obstructive pulmonary disease, unspecified COPD type       Other    Sleep apnea     Other Visit Diagnoses       Exertional dyspnea        Hiatal hernia        Upper airway cough syndrome        Relevant Medications    ipratropium (ATROVENT) 21 mcg (0.03 %) nasal spray          This patient does not have COPD.    Patient's cough seems to be originating due to 2 potential mechanisms.    Prescribe Serevent inhaler 50 mcg b.i.d. to treat cough variant asthma.    Prescribe Atrovent nasal spray in each nostril b.i.d. to treat upper airway cough syndrome.    Her shortness of breath is not primarily due to pulmonary issues.  Hiatal hernia is likely playing a role as well.  Deconditioning, body habitus and age maybe contributing as well.  Above treatment plan in reasoning was discussed with patient in the presence of her friend in detail.  All her questions were answered appropriately.    More than 50% of 60 min time was spent face-to-face on counseling, in reviewing the imaging studies, reviewing notes from primary care provider, performing  appropriate examination, counseling and educating the patient regarding the findings on PFTs/CXR, ordering medications and appropriate follow-up, documenting clinical information in the electronic medical record, care coordination as well as communicating with the primary referring physician.        Follow up in about 3 months (around 7/8/2024).  Thank you very much for involving me in the care of this patient.  Please do not hesitate to reach me if you have any further questions or concerns.    This note is dictated on M*Modal word recognition program.  There are word recognition mistakes that are occasionally missed on review.

## 2024-04-09 ENCOUNTER — TELEPHONE (OUTPATIENT)
Dept: FAMILY MEDICINE | Facility: CLINIC | Age: 89
End: 2024-04-09
Payer: MEDICARE

## 2024-05-02 DIAGNOSIS — E11.9 TYPE 2 DIABETES MELLITUS WITHOUT COMPLICATION, WITHOUT LONG-TERM CURRENT USE OF INSULIN: ICD-10-CM

## 2024-05-02 NOTE — TELEPHONE ENCOUNTER
Pt. States the Cymbalta 20mg 2qd caused hallucinations and sleepiness.  Pt. Wants back on the 30mg dose.  Pt. Went back to 30mg dose.  Is that ok?    Also, needs RF of Contour Next Strips.

## 2024-05-06 DIAGNOSIS — M25.50 GENERALIZED JOINT PAIN: ICD-10-CM

## 2024-05-06 RX ORDER — DULOXETIN HYDROCHLORIDE 20 MG/1
CAPSULE, DELAYED RELEASE ORAL
Qty: 180 CAPSULE | Refills: 0 | OUTPATIENT
Start: 2024-05-06

## 2024-05-06 RX ORDER — DULOXETIN HYDROCHLORIDE 30 MG/1
30 CAPSULE, DELAYED RELEASE ORAL DAILY
COMMUNITY
End: 2024-05-06 | Stop reason: SDUPTHER

## 2024-05-06 RX ORDER — DULOXETIN HYDROCHLORIDE 30 MG/1
30 CAPSULE, DELAYED RELEASE ORAL DAILY
Qty: 90 CAPSULE | Refills: 0 | Status: SHIPPED | OUTPATIENT
Start: 2024-05-06

## 2024-05-06 NOTE — TELEPHONE ENCOUNTER
----- Message from Carmen Samuel sent at 5/6/2024  8:48 AM CDT -----  Type:  Needs Medical Advice    Who Called: Ana Garcia  Symptoms (please be specific): -   How long has patient had these symptoms:  -  Pharmacy name and phone #:  -  Would the patient rather a call back or a response via [facility]?    Best Call Back Number: [phone]    Additional Information:  pt needs to speak w/ nurse she wants to know if her medication has been changed ( decreased ) please call

## 2024-05-22 ENCOUNTER — TELEPHONE (OUTPATIENT)
Dept: FAMILY MEDICINE | Facility: CLINIC | Age: 89
End: 2024-05-22
Payer: MEDICARE

## 2024-05-22 DIAGNOSIS — I10 PRIMARY HYPERTENSION: Primary | ICD-10-CM

## 2024-05-22 RX ORDER — DILTIAZEM HYDROCHLORIDE 180 MG/1
180 CAPSULE, COATED, EXTENDED RELEASE ORAL DAILY
Qty: 90 CAPSULE | Refills: 0 | Status: SHIPPED | OUTPATIENT
Start: 2024-05-22 | End: 2024-05-28 | Stop reason: SDUPTHER

## 2024-05-22 NOTE — TELEPHONE ENCOUNTER
LMVMx1 for pt. To call back.    We never received RF request from [facility] mail order.    We can't send rf to both mail order and local pharmacy because they will cancel each other out.

## 2024-05-22 NOTE — TELEPHONE ENCOUNTER
----- Message from Jostin Snyder sent at 5/22/2024  1:29 PM CDT -----  Contact: Ana  Type:  Patient Returning Call    Who Called:Ana   Who Left Message for Patient:Maya Aguilera MA  Does the patient know what this is regarding?:Yes   Would the patient rather a call back or a response via [facility]? Call back   Best Call Back Number:[phone]  Additional Information:

## 2024-05-22 NOTE — TELEPHONE ENCOUNTER
----- Message from Melissa Whittaker sent at 5/22/2024  1:11 PM CDT -----  Name of Who is Calling:pt      What is the request in detail:patient states [facility] has sent medication refill request without any response and she is requesting to speak with nurse to see how she can get this taken care of. Patient is completely out and will need partial sent to local pharmacy.     diltiaZEM (CARDIZEM CD) 180 MG 24 hr capsule - - 6/19/2023 - --  Class: Historical Med  Order: [id number]  Date/Time Signed: 8/23/2023 13:25          [id number] - [place], [place] - [address] [facility] [address]  [address] [facility]  [place] [address]  Phone: [phone] Fax: [phone]    [facility] - [place] [id number]  [address]  [address] [address]  Phone: [phone] Fax: [phone]      Can the clinic reply by [facility]:  no         What Number to Call Back if not in [facility]: [phone]

## 2024-05-28 DIAGNOSIS — I10 PRIMARY HYPERTENSION: ICD-10-CM

## 2024-05-28 RX ORDER — DILTIAZEM HYDROCHLORIDE 180 MG/1
180 CAPSULE, COATED, EXTENDED RELEASE ORAL DAILY
Qty: 90 CAPSULE | Refills: 0 | Status: SHIPPED | OUTPATIENT
Start: 2024-05-28

## 2024-05-28 NOTE — TELEPHONE ENCOUNTER
----- Message from Cheryl Martinez sent at 5/28/2024  8:45 AM CDT -----  Contact: self  Type:  RX Refill Request    Who Called: Ana Garcia  Refill or New Rx:refill  RX Name and Strength:diltiaZEM (CARDIZEM CD) 180 MG 24 hr capsule  How is the patient currently taking it? (ex. 1XDay):daily  Is this a 30 day or 90 day RX:30  Preferred Pharmacy with phone number:  [id number] - [place], [place] - [address] [facility] [address]  [address]  [address]  [place] [address]  Phone: [phone] Fax: [phone]      Local or Mail Order:local  Ordering Provider:shannan white  Would the patient rather a call back or a response via [facility]? cb  Best Call Back Number:[phone]  Additional Information: pt recently loss her son and would some type of sedative as well

## 2024-06-20 DIAGNOSIS — I10 PRIMARY HYPERTENSION: Primary | ICD-10-CM

## 2024-06-20 DIAGNOSIS — I10 PRIMARY HYPERTENSION: ICD-10-CM

## 2024-06-20 NOTE — TELEPHONE ENCOUNTER
----- Message from Ana Linton sent at 6/20/2024  9:22 AM CDT -----  Contact: pt  Pt calling for refill for losartan (COZAAR) 100 MG stating she is almost out.  Pt stated pharmacy has reached but no reply yet.  Pt would like a 90 day supply.   Pt can be reached [phone]        [facility] - [facility] [address]  [address]  [address] [address]  Phone: [phone] Fax: [phone]

## 2024-06-21 RX ORDER — LOSARTAN POTASSIUM 100 MG/1
100 TABLET ORAL DAILY
Qty: 90 TABLET | Refills: 0 | Status: SHIPPED | OUTPATIENT
Start: 2024-06-21 | End: 2024-06-21 | Stop reason: SDUPTHER

## 2024-06-21 RX ORDER — ISOSORBIDE MONONITRATE 60 MG/1
TABLET, EXTENDED RELEASE ORAL
Qty: 90 TABLET | Refills: 0 | Status: SHIPPED | OUTPATIENT
Start: 2024-06-21

## 2024-06-21 RX ORDER — LOSARTAN POTASSIUM 100 MG/1
100 TABLET ORAL DAILY
Qty: 90 TABLET | Refills: 0 | Status: SHIPPED | OUTPATIENT
Start: 2024-06-21

## 2024-06-21 RX ORDER — DULOXETIN HYDROCHLORIDE 30 MG/1
30 CAPSULE, DELAYED RELEASE ORAL
Qty: 90 CAPSULE | Refills: 0 | Status: SHIPPED | OUTPATIENT
Start: 2024-06-21

## 2024-06-21 NOTE — TELEPHONE ENCOUNTER
----- Message from Ariela Key sent at 6/20/2024  4:33 PM CDT -----  Patient is requesting a call back at [phone] (home) [phone] (work)

## 2024-08-22 DIAGNOSIS — I10 PRIMARY HYPERTENSION: ICD-10-CM

## 2024-08-24 DIAGNOSIS — E78.5 HYPERLIPIDEMIA, UNSPECIFIED HYPERLIPIDEMIA TYPE: ICD-10-CM

## 2024-08-24 DIAGNOSIS — E11.69 TYPE 2 DIABETES MELLITUS WITH OTHER SPECIFIED COMPLICATION, WITHOUT LONG-TERM CURRENT USE OF INSULIN: ICD-10-CM

## 2024-08-27 RX ORDER — METFORMIN HYDROCHLORIDE 500 MG/1
500 TABLET, EXTENDED RELEASE ORAL
Qty: 90 TABLET | Refills: 0 | Status: SHIPPED | OUTPATIENT
Start: 2024-08-27

## 2024-08-27 RX ORDER — ROSUVASTATIN CALCIUM 5 MG/1
5 TABLET, COATED ORAL
Qty: 90 TABLET | Refills: 0 | Status: SHIPPED | OUTPATIENT
Start: 2024-08-27

## 2024-08-27 RX ORDER — LOSARTAN POTASSIUM 100 MG/1
100 TABLET ORAL
Qty: 90 TABLET | Refills: 0 | Status: SHIPPED | OUTPATIENT
Start: 2024-08-27

## 2024-08-27 NOTE — TELEPHONE ENCOUNTER
Pt. Jose'd appt with Dr. Anabelle Hale.  Will be moving to [place], [place] by the end of the year.  Pt. States she has seen fly practice Dr. Crawford.

## 2024-09-19 DIAGNOSIS — I10 PRIMARY HYPERTENSION: ICD-10-CM

## 2024-09-19 RX ORDER — ISOSORBIDE MONONITRATE 60 MG/1
TABLET, EXTENDED RELEASE ORAL
Qty: 90 TABLET | Refills: 0 | Status: SHIPPED | OUTPATIENT
Start: 2024-09-19

## 2024-10-03 RX ORDER — DULOXETIN HYDROCHLORIDE 30 MG/1
30 CAPSULE, DELAYED RELEASE ORAL
Qty: 90 CAPSULE | Refills: 0 | Status: SHIPPED | OUTPATIENT
Start: 2024-10-03

## 2024-10-03 NOTE — TELEPHONE ENCOUNTER
----- Message from Cheryl sent at 10/3/2024  4:26 PM CDT -----  Contact: self  Type:  RX Refill Request    Who Called: Ana Garcia  Refill or New Rx:refill  RX Name and Strength:DULoxetine (CYMBALTA) 30 MG capsule  How is the patient currently taking it? (ex. 1XDay):daily  Is this a 30 day or 90 day RX:90  Preferred Pharmacy with phone number:    [facility] - [place] [address]  [address] [address]  Phone: [phone] Fax: [phone]    Local or Mail Order:mail order  Ordering Provider:shannan white  Would the patient rather a call back or a response via [facility]? deanne  Best Call Back Number:[phone]  Additional Information: n/a

## 2024-10-26 DIAGNOSIS — E11.69 TYPE 2 DIABETES MELLITUS WITH OTHER SPECIFIED COMPLICATION, WITHOUT LONG-TERM CURRENT USE OF INSULIN: ICD-10-CM

## 2024-10-26 DIAGNOSIS — E78.5 HYPERLIPIDEMIA, UNSPECIFIED HYPERLIPIDEMIA TYPE: ICD-10-CM

## 2024-10-28 DIAGNOSIS — I10 PRIMARY HYPERTENSION: ICD-10-CM

## 2024-10-29 RX ORDER — ROSUVASTATIN CALCIUM 5 MG/1
5 TABLET, COATED ORAL
Qty: 90 TABLET | Refills: 0 | Status: SHIPPED | OUTPATIENT
Start: 2024-10-29

## 2024-10-29 RX ORDER — METFORMIN HYDROCHLORIDE 500 MG/1
500 TABLET, EXTENDED RELEASE ORAL
Qty: 90 TABLET | Refills: 0 | Status: SHIPPED | OUTPATIENT
Start: 2024-10-29

## 2024-10-29 RX ORDER — LOSARTAN POTASSIUM 100 MG/1
100 TABLET ORAL
Qty: 90 TABLET | Refills: 0 | Status: SHIPPED | OUTPATIENT
Start: 2024-10-29